# Patient Record
Sex: FEMALE | Race: WHITE | Employment: FULL TIME | URBAN - METROPOLITAN AREA
[De-identification: names, ages, dates, MRNs, and addresses within clinical notes are randomized per-mention and may not be internally consistent; named-entity substitution may affect disease eponyms.]

---

## 2018-01-29 PROBLEM — R00.2 PALPITATIONS: Status: ACTIVE | Noted: 2018-01-29

## 2019-01-15 ENCOUNTER — HOSPITAL ENCOUNTER (EMERGENCY)
Age: 29
Discharge: HOME | End: 2019-01-15
Payer: COMMERCIAL

## 2019-01-15 VITALS
DIASTOLIC BLOOD PRESSURE: 72 MMHG | HEART RATE: 76 BPM | WEIGHT: 165 LBS | HEIGHT: 63 IN | RESPIRATION RATE: 16 BRPM | SYSTOLIC BLOOD PRESSURE: 128 MMHG | TEMPERATURE: 98 F | BODY MASS INDEX: 29.23 KG/M2 | OXYGEN SATURATION: 98 %

## 2019-01-15 DIAGNOSIS — N39.0 URINARY TRACT INFECTION WITH HEMATURIA, SITE UNSPECIFIED: Primary | ICD-10-CM

## 2019-01-15 DIAGNOSIS — R31.9 URINARY TRACT INFECTION WITH HEMATURIA, SITE UNSPECIFIED: Primary | ICD-10-CM

## 2019-01-15 LAB
BACTERIA: ABNORMAL /HPF
BILIRUBIN URINE: ABNORMAL
BLOOD, URINE: ABNORMAL
CLARITY: CLEAR
COLOR: ABNORMAL
GLUCOSE URINE: 100 MG/DL
HCG(URINE) PREGNANCY TEST: NEGATIVE
KETONES, URINE: ABNORMAL MG/DL
LEUKOCYTE ESTERASE, URINE: ABNORMAL
NITRITE, URINE: POSITIVE
PH UA: 5.5 (ref 5–9)
PROTEIN UA: 100 MG/DL
RBC UA: ABNORMAL /HPF (ref 0–2)
SPECIFIC GRAVITY UA: 1.01 (ref 1–1.03)
UROBILINOGEN, URINE: >=8 E.U./DL
WBC UA: ABNORMAL /HPF (ref 0–5)

## 2019-01-15 PROCEDURE — 81025 URINE PREGNANCY TEST: CPT

## 2019-01-15 PROCEDURE — 87088 URINE BACTERIA CULTURE: CPT

## 2019-01-15 PROCEDURE — 81001 URINALYSIS AUTO W/SCOPE: CPT

## 2019-01-15 PROCEDURE — 99283 EMERGENCY DEPT VISIT LOW MDM: CPT

## 2019-01-15 PROCEDURE — 87077 CULTURE AEROBIC IDENTIFY: CPT

## 2019-01-15 PROCEDURE — 87186 SC STD MICRODIL/AGAR DIL: CPT

## 2019-01-15 RX ORDER — PHENAZOPYRIDINE HYDROCHLORIDE 200 MG/1
200 TABLET, FILM COATED ORAL 3 TIMES DAILY PRN
Qty: 6 TABLET | Refills: 0 | Status: SHIPPED | OUTPATIENT
Start: 2019-01-15 | End: 2019-01-17

## 2019-01-15 RX ORDER — CEFDINIR 300 MG/1
300 CAPSULE ORAL 2 TIMES DAILY
Qty: 14 CAPSULE | Refills: 0 | Status: SHIPPED | OUTPATIENT
Start: 2019-01-15 | End: 2019-01-22

## 2019-01-15 ASSESSMENT — PAIN DESCRIPTION - DESCRIPTORS: DESCRIPTORS: BURNING

## 2019-01-15 ASSESSMENT — PAIN DESCRIPTION - FREQUENCY: FREQUENCY: CONTINUOUS

## 2019-01-15 ASSESSMENT — PAIN DESCRIPTION - PAIN TYPE: TYPE: ACUTE PAIN

## 2019-01-18 LAB
ORGANISM: ABNORMAL
URINE CULTURE, ROUTINE: ABNORMAL
URINE CULTURE, ROUTINE: ABNORMAL

## 2019-02-02 ENCOUNTER — HOSPITAL ENCOUNTER (EMERGENCY)
Age: 29
Discharge: HOME | End: 2019-02-02
Attending: FAMILY MEDICINE
Payer: COMMERCIAL

## 2019-02-02 VITALS
TEMPERATURE: 98.2 F | HEART RATE: 97 BPM | BODY MASS INDEX: 27.31 KG/M2 | HEIGHT: 64 IN | DIASTOLIC BLOOD PRESSURE: 72 MMHG | SYSTOLIC BLOOD PRESSURE: 118 MMHG | RESPIRATION RATE: 16 BRPM | OXYGEN SATURATION: 98 % | WEIGHT: 160 LBS

## 2019-02-02 DIAGNOSIS — N39.0 URINARY TRACT INFECTION WITHOUT HEMATURIA, SITE UNSPECIFIED: Primary | ICD-10-CM

## 2019-02-02 LAB
BACTERIA: ABNORMAL /HPF
BILIRUBIN URINE: NEGATIVE
BLOOD, URINE: NEGATIVE
CLARITY: ABNORMAL
COLOR: YELLOW
EPITHELIAL CELLS, UA: ABNORMAL /HPF
GLUCOSE URINE: NEGATIVE MG/DL
HCG(URINE) PREGNANCY TEST: NEGATIVE
KETONES, URINE: NEGATIVE MG/DL
LEUKOCYTE ESTERASE, URINE: NEGATIVE
NITRITE, URINE: NEGATIVE
PH UA: 6 (ref 5–9)
PROTEIN UA: NEGATIVE MG/DL
RBC UA: ABNORMAL /HPF (ref 0–2)
SPECIFIC GRAVITY UA: 1.01 (ref 1–1.03)
UROBILINOGEN, URINE: 0.2 E.U./DL
WBC UA: ABNORMAL /HPF (ref 0–5)
YEAST: ABNORMAL

## 2019-02-02 PROCEDURE — 81001 URINALYSIS AUTO W/SCOPE: CPT

## 2019-02-02 PROCEDURE — 87186 SC STD MICRODIL/AGAR DIL: CPT

## 2019-02-02 PROCEDURE — 87077 CULTURE AEROBIC IDENTIFY: CPT

## 2019-02-02 PROCEDURE — 99283 EMERGENCY DEPT VISIT LOW MDM: CPT

## 2019-02-02 PROCEDURE — 81025 URINE PREGNANCY TEST: CPT

## 2019-02-02 PROCEDURE — 87088 URINE BACTERIA CULTURE: CPT

## 2019-02-02 RX ORDER — NITROFURANTOIN 25; 75 MG/1; MG/1
100 CAPSULE ORAL 2 TIMES DAILY
Qty: 14 CAPSULE | Refills: 0 | Status: SHIPPED | OUTPATIENT
Start: 2019-02-02 | End: 2019-02-12

## 2019-02-02 RX ORDER — PHENAZOPYRIDINE HYDROCHLORIDE 100 MG/1
100 TABLET, FILM COATED ORAL 3 TIMES DAILY PRN
COMMUNITY
End: 2022-03-31

## 2019-02-02 ASSESSMENT — PAIN DESCRIPTION - FREQUENCY: FREQUENCY: INTERMITTENT

## 2019-02-02 ASSESSMENT — PAIN DESCRIPTION - LOCATION: LOCATION: PERINEUM

## 2019-02-02 ASSESSMENT — PAIN - FUNCTIONAL ASSESSMENT: PAIN_FUNCTIONAL_ASSESSMENT: ACTIVITIES ARE NOT PREVENTED

## 2019-02-02 ASSESSMENT — PAIN DESCRIPTION - DESCRIPTORS: DESCRIPTORS: DISCOMFORT;PRESSURE

## 2019-02-02 ASSESSMENT — PAIN SCALES - GENERAL: PAINLEVEL_OUTOF10: 5

## 2019-02-02 ASSESSMENT — PAIN DESCRIPTION - PAIN TYPE: TYPE: ACUTE PAIN

## 2019-02-04 LAB
ORGANISM: ABNORMAL
URINE CULTURE, ROUTINE: ABNORMAL
URINE CULTURE, ROUTINE: ABNORMAL

## 2022-02-04 ENCOUNTER — APPOINTMENT (OUTPATIENT)
Dept: CT IMAGING | Age: 32
End: 2022-02-04
Payer: OTHER GOVERNMENT

## 2022-02-04 ENCOUNTER — HOSPITAL ENCOUNTER (EMERGENCY)
Age: 32
Discharge: HOME | End: 2022-02-04
Attending: EMERGENCY MEDICINE
Payer: OTHER GOVERNMENT

## 2022-02-04 VITALS
BODY MASS INDEX: 28.35 KG/M2 | TEMPERATURE: 98.3 F | HEART RATE: 80 BPM | DIASTOLIC BLOOD PRESSURE: 75 MMHG | RESPIRATION RATE: 16 BRPM | HEIGHT: 63 IN | WEIGHT: 160 LBS | OXYGEN SATURATION: 99 % | SYSTOLIC BLOOD PRESSURE: 111 MMHG

## 2022-02-04 DIAGNOSIS — R51.9 NONINTRACTABLE EPISODIC HEADACHE, UNSPECIFIED HEADACHE TYPE: ICD-10-CM

## 2022-02-04 DIAGNOSIS — J32.9 OTHER SINUSITIS, UNSPECIFIED CHRONICITY: Primary | ICD-10-CM

## 2022-02-04 LAB
ANION GAP SERPL CALCULATED.3IONS-SCNC: 14 MMOL/L (ref 7–16)
BASOPHILS ABSOLUTE: 0.08 E9/L (ref 0–0.2)
BASOPHILS RELATIVE PERCENT: 1.6 % (ref 0–2)
BUN BLDV-MCNC: 9 MG/DL (ref 6–20)
CALCIUM SERPL-MCNC: 9.5 MG/DL (ref 8.6–10.2)
CHLORIDE BLD-SCNC: 102 MMOL/L (ref 98–107)
CO2: 25 MMOL/L (ref 22–29)
CREAT SERPL-MCNC: 0.7 MG/DL (ref 0.5–1)
EOSINOPHILS ABSOLUTE: 0.19 E9/L (ref 0.05–0.5)
EOSINOPHILS RELATIVE PERCENT: 3.9 % (ref 0–6)
GFR AFRICAN AMERICAN: >60
GFR NON-AFRICAN AMERICAN: >60 ML/MIN/1.73
GLUCOSE BLD-MCNC: 96 MG/DL (ref 74–99)
HCG, URINE, POC: NEGATIVE
HCT VFR BLD CALC: 41.9 % (ref 34–48)
HEMOGLOBIN: 14.2 G/DL (ref 11.5–15.5)
IMMATURE GRANULOCYTES #: 0.01 E9/L
IMMATURE GRANULOCYTES %: 0.2 % (ref 0–5)
LYMPHOCYTES ABSOLUTE: 1.49 E9/L (ref 1.5–4)
LYMPHOCYTES RELATIVE PERCENT: 30.7 % (ref 20–42)
Lab: NORMAL
MCH RBC QN AUTO: 29.1 PG (ref 26–35)
MCHC RBC AUTO-ENTMCNC: 33.9 % (ref 32–34.5)
MCV RBC AUTO: 85.9 FL (ref 80–99.9)
MONOCYTES ABSOLUTE: 0.42 E9/L (ref 0.1–0.95)
MONOCYTES RELATIVE PERCENT: 8.7 % (ref 2–12)
NEGATIVE QC PASS/FAIL: NORMAL
NEUTROPHILS ABSOLUTE: 2.66 E9/L (ref 1.8–7.3)
NEUTROPHILS RELATIVE PERCENT: 54.9 % (ref 43–80)
PDW BLD-RTO: 11.9 FL (ref 11.5–15)
PLATELET # BLD: 192 E9/L (ref 130–450)
PMV BLD AUTO: 11.9 FL (ref 7–12)
POSITIVE QC PASS/FAIL: NORMAL
POTASSIUM REFLEX MAGNESIUM: 4 MMOL/L (ref 3.5–5)
RBC # BLD: 4.88 E12/L (ref 3.5–5.5)
SODIUM BLD-SCNC: 141 MMOL/L (ref 132–146)
WBC # BLD: 4.9 E9/L (ref 4.5–11.5)

## 2022-02-04 PROCEDURE — 70450 CT HEAD/BRAIN W/O DYE: CPT

## 2022-02-04 PROCEDURE — 80048 BASIC METABOLIC PNL TOTAL CA: CPT

## 2022-02-04 PROCEDURE — 70486 CT MAXILLOFACIAL W/O DYE: CPT

## 2022-02-04 PROCEDURE — 85025 COMPLETE CBC W/AUTO DIFF WBC: CPT

## 2022-02-04 PROCEDURE — 99285 EMERGENCY DEPT VISIT HI MDM: CPT

## 2022-02-04 ASSESSMENT — PAIN DESCRIPTION - FREQUENCY: FREQUENCY: INTERMITTENT

## 2022-02-04 ASSESSMENT — PAIN DESCRIPTION - DESCRIPTORS: DESCRIPTORS: PRESSURE

## 2022-02-04 ASSESSMENT — PAIN DESCRIPTION - LOCATION: LOCATION: HEAD

## 2022-02-04 ASSESSMENT — PAIN DESCRIPTION - ONSET: ONSET: GRADUAL

## 2022-02-04 ASSESSMENT — PAIN - FUNCTIONAL ASSESSMENT: PAIN_FUNCTIONAL_ASSESSMENT: PREVENTS OR INTERFERES SOME ACTIVE ACTIVITIES AND ADLS

## 2022-02-04 ASSESSMENT — PAIN DESCRIPTION - ORIENTATION: ORIENTATION: ANTERIOR

## 2022-02-04 ASSESSMENT — PAIN DESCRIPTION - PAIN TYPE: TYPE: ACUTE PAIN

## 2022-02-04 ASSESSMENT — PAIN SCALES - GENERAL: PAINLEVEL_OUTOF10: 4
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
## 2022-02-05 ASSESSMENT — ENCOUNTER SYMPTOMS
ABDOMINAL PAIN: 0
SORE THROAT: 0
SHORTNESS OF BREATH: 0
DIARRHEA: 0
RHINORRHEA: 1
BACK PAIN: 0
COUGH: 0
VOMITING: 0
NAUSEA: 0

## 2022-04-27 ENCOUNTER — APPOINTMENT (OUTPATIENT)
Dept: ULTRASOUND IMAGING | Age: 32
End: 2022-04-27
Payer: OTHER GOVERNMENT

## 2022-04-27 ENCOUNTER — HOSPITAL ENCOUNTER (EMERGENCY)
Age: 32
Discharge: HOME | End: 2022-04-27
Payer: OTHER GOVERNMENT

## 2022-04-27 VITALS
DIASTOLIC BLOOD PRESSURE: 66 MMHG | TEMPERATURE: 98.1 F | HEART RATE: 88 BPM | OXYGEN SATURATION: 100 % | RESPIRATION RATE: 16 BRPM | SYSTOLIC BLOOD PRESSURE: 140 MMHG

## 2022-04-27 DIAGNOSIS — O46.90 VAGINAL BLEEDING IN PREGNANCY: Primary | ICD-10-CM

## 2022-04-27 LAB
ABO/RH: NORMAL
ANION GAP SERPL CALCULATED.3IONS-SCNC: 10 MMOL/L (ref 7–16)
BACTERIA: ABNORMAL /HPF
BASOPHILS ABSOLUTE: 0.04 E9/L (ref 0–0.2)
BASOPHILS RELATIVE PERCENT: 0.5 % (ref 0–2)
BILIRUBIN URINE: NEGATIVE
BLOOD, URINE: ABNORMAL
BUN BLDV-MCNC: 8 MG/DL (ref 6–20)
CALCIUM SERPL-MCNC: 9.3 MG/DL (ref 8.6–10.2)
CHLORIDE BLD-SCNC: 101 MMOL/L (ref 98–107)
CLARITY: CLEAR
CO2: 24 MMOL/L (ref 22–29)
COLOR: YELLOW
CREAT SERPL-MCNC: 0.6 MG/DL (ref 0.5–1)
EOSINOPHILS ABSOLUTE: 0.24 E9/L (ref 0.05–0.5)
EOSINOPHILS RELATIVE PERCENT: 3 % (ref 0–6)
GFR AFRICAN AMERICAN: >60
GFR NON-AFRICAN AMERICAN: >60 ML/MIN/1.73
GLUCOSE BLD-MCNC: 98 MG/DL (ref 74–99)
GLUCOSE URINE: NEGATIVE MG/DL
HCG, URINE, POC: POSITIVE
HCT VFR BLD CALC: 38.2 % (ref 34–48)
HEMOGLOBIN: 13 G/DL (ref 11.5–15.5)
IMMATURE GRANULOCYTES #: 0.03 E9/L
IMMATURE GRANULOCYTES %: 0.4 % (ref 0–5)
KETONES, URINE: NEGATIVE MG/DL
LEUKOCYTE ESTERASE, URINE: NEGATIVE
LYMPHOCYTES ABSOLUTE: 1.88 E9/L (ref 1.5–4)
LYMPHOCYTES RELATIVE PERCENT: 23.8 % (ref 20–42)
Lab: NORMAL
MCH RBC QN AUTO: 29.4 PG (ref 26–35)
MCHC RBC AUTO-ENTMCNC: 34 % (ref 32–34.5)
MCV RBC AUTO: 86.4 FL (ref 80–99.9)
MONOCYTES ABSOLUTE: 0.62 E9/L (ref 0.1–0.95)
MONOCYTES RELATIVE PERCENT: 7.9 % (ref 2–12)
NEGATIVE QC PASS/FAIL: NORMAL
NEUTROPHILS ABSOLUTE: 5.08 E9/L (ref 1.8–7.3)
NEUTROPHILS RELATIVE PERCENT: 64.4 % (ref 43–80)
NITRITE, URINE: NEGATIVE
PDW BLD-RTO: 12 FL (ref 11.5–15)
PH UA: 6.5 (ref 5–9)
PLATELET # BLD: 207 E9/L (ref 130–450)
PMV BLD AUTO: 11.4 FL (ref 7–12)
POSITIVE QC PASS/FAIL: NORMAL
POTASSIUM SERPL-SCNC: 3.9 MMOL/L (ref 3.5–5)
PROTEIN UA: NEGATIVE MG/DL
RBC # BLD: 4.42 E12/L (ref 3.5–5.5)
RBC UA: ABNORMAL /HPF (ref 0–2)
SODIUM BLD-SCNC: 135 MMOL/L (ref 132–146)
SPECIFIC GRAVITY UA: 1.02 (ref 1–1.03)
UROBILINOGEN, URINE: 0.2 E.U./DL
WBC # BLD: 7.9 E9/L (ref 4.5–11.5)
WBC UA: ABNORMAL /HPF (ref 0–5)

## 2022-04-27 PROCEDURE — 76817 TRANSVAGINAL US OBSTETRIC: CPT

## 2022-04-27 PROCEDURE — 99284 EMERGENCY DEPT VISIT MOD MDM: CPT

## 2022-04-27 PROCEDURE — 80048 BASIC METABOLIC PNL TOTAL CA: CPT

## 2022-04-27 PROCEDURE — 86900 BLOOD TYPING SEROLOGIC ABO: CPT

## 2022-04-27 PROCEDURE — 85025 COMPLETE CBC W/AUTO DIFF WBC: CPT

## 2022-04-27 PROCEDURE — 86901 BLOOD TYPING SEROLOGIC RH(D): CPT

## 2022-04-27 PROCEDURE — 76801 OB US < 14 WKS SINGLE FETUS: CPT

## 2022-04-27 PROCEDURE — 81001 URINALYSIS AUTO W/SCOPE: CPT

## 2022-04-27 PROCEDURE — 36415 COLL VENOUS BLD VENIPUNCTURE: CPT

## 2022-04-27 NOTE — ED PROVIDER NOTES
[facility]    HPI:  22,   Time: 4:14 PM EDT         Morelia Sal is a 33 y.o. female presenting to the ED for vaginal bleeding in pregnancy, beginning this a.m. ago. The complaint has been intermittent, mild in severity, and worsened by nothing. Presents for complaints of a sudden onset of vaginal bleeding which began approximately early a.m. this morning. States he is approximately 12 weeks pregnant she is a  A1. She states after working night shift she came home got onto the toilet to go to the bathroom and noticed that she had heavy bleeding in the toilet as well as on the tissue paper. She denies any abdominal pain cramping or pain at this time. She is being followed by Dr. Gloria Maza and did have an ultrasound to confirm an IUP approximately 3 weeks ago. She states the vaginal bleeding has subsided over the past few hours. ROS:   Pertinent positives and negatives are stated within HPI, all other systems reviewed and are negative.  --------------------------------------------- PAST HISTORY ---------------------------------------------  Past Medical History:  has a past medical history of Ovarian cyst.    Past Surgical History:  has no past surgical history on file. Social History:  reports that she has never smoked. She has never used smokeless tobacco. She reports that she does not drink alcohol and does not use drugs. Family History: family history includes Hypertension in her father and mother; Leukemia in her maternal grandmother. The patients home medications have been reviewed. Allergies: Patient has no known allergies.     -------------------------------------------------- RESULTS -------------------------------------------------  All laboratory and radiology results have been personally reviewed by myself   LABS:  Results for orders placed or performed during the hospital encounter of 22   CBC with Auto Differential   Result Value Ref Range    WBC 7.9 4.5 - 11.5 E9/L    RBC 4.42 3.50 - 5.50 E12/L    Hemoglobin 13.0 11.5 - 15.5 g/dL    Hematocrit 38.2 34.0 - 48.0 %    MCV 86.4 80.0 - 99.9 fL    MCH 29.4 26.0 - 35.0 pg    MCHC 34.0 32.0 - 34.5 %    RDW 12.0 11.5 - 15.0 fL    Platelets 378 076 - 100 E9/L    MPV 11.4 7.0 - 12.0 fL    Neutrophils % 64.4 43.0 - 80.0 %    Immature Granulocytes % 0.4 0.0 - 5.0 %    Lymphocytes % 23.8 20.0 - 42.0 %    Monocytes % 7.9 2.0 - 12.0 %    Eosinophils % 3.0 0.0 - 6.0 %    Basophils % 0.5 0.0 - 2.0 %    Neutrophils Absolute 5.08 1.80 - 7.30 E9/L    Immature Granulocytes # 0.03 E9/L    Lymphocytes Absolute 1.88 1.50 - 4.00 E9/L    Monocytes Absolute 0.62 0.10 - 0.95 E9/L    Eosinophils Absolute 0.24 0.05 - 0.50 E9/L    Basophils Absolute 0.04 0.00 - 0.20 U3/E   Basic Metabolic Panel   Result Value Ref Range    Sodium 135 132 - 146 mmol/L    Potassium 3.9 3.5 - 5.0 mmol/L    Chloride 101 98 - 107 mmol/L    CO2 24 22 - 29 mmol/L    Anion Gap 10 7 - 16 mmol/L    Glucose 98 74 - 99 mg/dL    BUN 8 6 - 20 mg/dL    CREATININE 0.6 0.5 - 1.0 mg/dL    GFR Non-African American >60 >=60 mL/min/1.73    GFR African American >60     Calcium 9.3 8.6 - 10.2 mg/dL   Urinalysis   Result Value Ref Range    Color, UA Yellow Straw/Yellow    Clarity, UA Clear Clear    Glucose, Ur Negative Negative mg/dL    Bilirubin Urine Negative Negative    Ketones, Urine Negative Negative mg/dL    Specific Gravity, UA 1.020 1.005 - 1.030    Blood, Urine LARGE (A) Negative    pH, UA 6.5 5.0 - 9.0    Protein, UA Negative Negative mg/dL    Urobilinogen, Urine 0.2 <2.0 E.U./dL    Nitrite, Urine Negative Negative    Leukocyte Esterase, Urine Negative Negative   Microscopic Urinalysis   Result Value Ref Range    WBC, UA NONE 0 - 5 /HPF    RBC, UA 2-5 0 - 2 /HPF    Bacteria, UA RARE (A) None Seen /HPF   POC Pregnancy Urine Qual   Result Value Ref Range    HCG, Urine, POC Positive Negative    Lot Number [id number]     Positive QC Pass/Fail Pass     Negative QC Pass/Fail Pass ABO/RH   Result Value Ref Range    ABO/Rh O POS        RADIOLOGY:  Interpreted by Radiologist.  US OB TRANSVAGINAL   Final Result   1. Single living intrauterine gestation with an estimated gestational age by   ultrasound of 13 weeks and 1 day which is concordant to the clinical age   provided of 12 weeks and 4 days. Estimated date of delivery based on current   ultrasound is November 1, 2022. Fetal heart rate was 154 beats per minute. 2.  The cervix appears closed at the internal cervical os. There is a small   amount of heterogeneous fluid near the external cervical os. (Attention to the cervical length suggested at the patient's next ultrasound.)      3. Nonvisualization of either ovary. There are no adnexal masses. Recommend scheduling the patient for anatomic survey at approximately 20   weeks gestation. Imaging should occur sooner if patient's symptoms continue   to worsen. US OB LESS THAN 14 WEEKS SINGLE OR FIRST GESTATION   Final Result   1. Single living intrauterine gestation with an estimated gestational age by   ultrasound of 13 weeks and 1 day which is concordant to the clinical age   provided of 12 weeks and 4 days. Estimated date of delivery based on current   ultrasound is November 1, 2022. Fetal heart rate was 154 beats per minute. 2.  The cervix appears closed at the internal cervical os. There is a small   amount of heterogeneous fluid near the external cervical os. (Attention to the cervical length suggested at the patient's next ultrasound.)      3. Nonvisualization of either ovary. There are no adnexal masses. Recommend scheduling the patient for anatomic survey at approximately 20   weeks gestation. Imaging should occur sooner if patient's symptoms continue   to worsen.              ------------------------- NURSING NOTES AND VITALS REVIEWED ---------------------------   The nursing notes within the ED encounter and vital signs as below have been reviewed. BP (!) 140/66   Pulse 88   Temp 98.1 °F (36.7 °C) (Oral)   Resp 16   LMP 01/29/2022   SpO2 100%   Oxygen Saturation Interpretation: Normal      ---------------------------------------------------PHYSICAL EXAM--------------------------------------      Constitutional/General: Alert and oriented x3, well appearing, non toxic in NAD  Head: NC/AT  Eyes: PERRL, EOMI  Mouth: Oropharynx clear, handling secretions, no trismus  Neck: Supple, full ROM, no meningeal signs  Pulmonary: Lungs clear to auscultation bilaterally, no wheezes, rales, or rhonchi. Not in respiratory distress  Cardiovascular:  Regular rate and rhythm, no murmurs, gallops, or rubs. 2+ distal pulses  Abdomen: Soft, non tender, non distended,   : Pelvic exam completed with female chaperone present. Small amount of brown old bloody vaginal discharge noted. No cervical motion tenderness noted. Cervix is closed. no adnexal tenderness. Cultures were obtained. Patient tolerated well. Extremities: Moves all extremities x 4. Warm and well perfused  Skin: warm and dry without rash  Neurologic: GCS 15,  Psych: Normal Affect      ------------------------------ ED COURSE/MEDICAL DECISION MAKING----------------------  Medications - No data to display      Medical Decision Making:    Labs and ultrasound reviewed at length good fetal heart rate at 154. Patient advised to follow-up with OB/GYN if any further problems advised to maintain strict pelvic rest until seen by OB/GYN    Counseling: The emergency provider has spoken with the patient and discussed todays results, in addition to providing specific details for the plan of care and counseling regarding the diagnosis and prognosis. Questions are answered at this time and they are agreeable with the plan.      --------------------------------- IMPRESSION AND DISPOSITION ---------------------------------    IMPRESSION  1.  Vaginal bleeding in pregnancy        DISPOSITION  Disposition: Discharge to home  Patient condition is good                  JUSTIN Reynoso - LEROY  04/27/22 1665

## 2022-04-27 NOTE — ED NOTES
Department of Emergency Medicine  FIRST PROVIDER TRIAGE NOTE             [facility]           4/27/22  2:54 PM EDT    Date of Encounter: 4/27/22   MRN: [id number]      HPI: Sebastian Rossi is a 33 y.o. female who presents to the ED for Vaginal Bleeding (12 weeks pregnant, woke this afternoon and has heavy amount bleeding )      ROS: Negative for cp, sob, fever or cough. PE: Gen Appearance/Constitutional: alert  Musculoskeletal: moves all extremities x 4     Initial Plan of Care: All treatment areas with department are currently occupied. Plan to order/Initiate the following while awaiting opening in ED: labs and imaging studies.   Initiate Treatment-Testing, Proceed toTreatment Area When Bed Available for ED Attending/MLP to Continue Care    Electronically signed by JUSTIN Prater CNP   DD: 4/27/22       JUSTIN Liu CNP  04/27/22 [id number]

## 2022-05-05 ENCOUNTER — ANCILLARY PROCEDURE (OUTPATIENT)
Dept: OBGYN CLINIC | Age: 32
End: 2022-05-05
Payer: OTHER GOVERNMENT

## 2022-05-05 ENCOUNTER — INITIAL PRENATAL (OUTPATIENT)
Dept: OBGYN CLINIC | Age: 32
End: 2022-05-05
Payer: OTHER GOVERNMENT

## 2022-05-05 VITALS
HEART RATE: 111 BPM | BODY MASS INDEX: 30.91 KG/M2 | DIASTOLIC BLOOD PRESSURE: 83 MMHG | SYSTOLIC BLOOD PRESSURE: 120 MMHG | WEIGHT: 174.5 LBS

## 2022-05-05 DIAGNOSIS — O41.8X20 SUBCHORIONIC HEMATOMA IN SECOND TRIMESTER, SINGLE OR UNSPECIFIED FETUS: ICD-10-CM

## 2022-05-05 DIAGNOSIS — Z34.90 THIRD PREGNANCY: ICD-10-CM

## 2022-05-05 DIAGNOSIS — Z3A.14 14 WEEKS GESTATION OF PREGNANCY: ICD-10-CM

## 2022-05-05 DIAGNOSIS — O46.92 ANTEPARTUM BLEEDING, SECOND TRIMESTER: ICD-10-CM

## 2022-05-05 DIAGNOSIS — R00.2 PALPITATIONS: Primary | ICD-10-CM

## 2022-05-05 DIAGNOSIS — O46.8X2 SUBCHORIONIC HEMATOMA IN SECOND TRIMESTER, SINGLE OR UNSPECIFIED FETUS: ICD-10-CM

## 2022-05-05 LAB
GLUCOSE URINE, POC: NEGATIVE
PROTEIN UA: NEGATIVE

## 2022-05-05 PROCEDURE — 76817 TRANSVAGINAL US OBSTETRIC: CPT | Performed by: OBSTETRICS & GYNECOLOGY

## 2022-05-05 PROCEDURE — 81002 URINALYSIS NONAUTO W/O SCOPE: CPT | Performed by: OBSTETRICS & GYNECOLOGY

## 2022-05-05 PROCEDURE — 99203 OFFICE O/P NEW LOW 30 MIN: CPT | Performed by: OBSTETRICS & GYNECOLOGY

## 2022-05-05 PROCEDURE — 99999 PR OFFICE/OUTPT VISIT,PROCEDURE ONLY: CPT | Performed by: OBSTETRICS & GYNECOLOGY

## 2022-05-05 PROCEDURE — 76805 OB US >/= 14 WKS SNGL FETUS: CPT | Performed by: OBSTETRICS & GYNECOLOGY

## 2022-05-05 NOTE — PROGRESS NOTES
Pt here for pregnancy ultrasound  Pt denies any bleeding/cramping/lof since last Friday -had bleeding last Wednesday and went to ER -had ultrasound and had bleeding on Friday ?  Hematoma  Pt states no bleeding now

## 2022-05-05 NOTE — PROGRESS NOTES
[address] FETAL MEDICINE  [address] [facility] [address] [phone]   [address] FETAL MEDICINE  [address]  [facility] [place] [address]  Dept: [address]: [phone]     2022    RE:  Jero Becerril     : 1990   AGE: 33 y.o. Dear Dr. Rupali Rubi,    Thank you for allowing me to see Jero Becerril. As I'm sure you will recall, Jero Becerril is a 33 y.o. [id number]'s last menstrual period was 2022. Estimated Date of Delivery: 22 at 14w2d seen in our office today for the following:    REASON FOR VISIT: Cervical Length    Patient Active Problem List    Diagnosis Date Noted    14 weeks gestation of pregnancy 2022    Third pregnancy 2022    Antepartum bleeding, second trimester 2022    Subchorionic hematoma in second trimester 2022    Palpitations 2018        PAST HISTORY:  OB History    Para Term  AB Living   3 1 1   1 1   SAB IAB Ectopic Molar Multiple Live Births     1       1      # Outcome Date GA Lbr Juan Carlos/2nd Weight Sex Delivery Anes PTL Lv   3 Current            2 Term 13 40w1d 07:45 7 lb 14 oz (3.572 kg) M Vag-Spont EPI N CK   1 IAB                   MEDICAL:  Past Medical History:   Diagnosis Date    Ovarian cyst         SURGICAL:  Past Surgical History:   Procedure Laterality Date    UMBILICAL HERNIA REPAIR         ALLERGIES:    No Known Allergies      MEDICATIONS:    Current Outpatient Medications   Medication Sig Dispense Refill    Prenatal Vit-Fe Fumarate-FA (PRENATAL PLUS) 27-1 MG TABS Take 1 tablet by mouth daily 30 tablet 11     No current facility-administered medications for this visit.         Social History     Socioeconomic History    Marital status:      Spouse name: None    Number of children: None    Years of education: None    Highest education level: None   Occupational History    None   Tobacco Use    Smoking status: Never Smoker    Smokeless tobacco: Never Used   Vaping Use    Vaping Use: Never used   Substance and Sexual Activity    Alcohol use: No    Drug use: No    Sexual activity: Yes     Partners: Male   Other Topics Concern    None   Social History Narrative    None     Social Determinants of Health     Financial Resource Strain:     Difficulty of Paying Living Expenses: Not on file   Food Insecurity:     Worried About Running Out of Food in the Last Year: Not on file    Jm of Food in the Last Year: Not on file   Transportation Needs:     Lack of Transportation (Medical): Not on file    Lack of Transportation (Non-Medical):  Not on file   Physical Activity:     Days of Exercise per Week: Not on file    Minutes of Exercise per Session: Not on file   Stress:     Feeling of Stress : Not on file   Social Connections:     Frequency of Communication with Friends and Family: Not on file    Frequency of Social Gatherings with Friends and Family: Not on file    Attends Jainism Services: Not on file    Active Member of [address] [place] [address] or Organizations: Not on file    Attends Club or Organization Meetings: Not on file    Marital Status: Not on file   Intimate Partner Violence:     Fear of Current or Ex-Partner: Not on file    Emotionally Abused: Not on file    Physically Abused: Not on file    Sexually Abused: Not on file   Housing Stability:     Unable to Pay for Housing in the Last Year: Not on file    Number of [place] in the Last Year: Not on file    Unstable Housing in the Last Year: Not on file          FAMILY MEDICAL HISTORY:   Family History   Problem Relation Age of Onset    Leukemia Maternal Grandmother     Hypertension Father     Hypertension Mother               PHYSICAL EXAMINATION:  /83   Pulse 111   Wt 174 lb 8 oz (79.2 kg)   LMP 01/29/2022   Body mass index is 30.91 kg/m². Urine dipstick:  Results for POC orders placed in visit on 05/05/22   POCT urine qual dipstick protein   Result Value Ref Range    Protein, UA Negative Negative   POCT urine qual dipstick glucose   Result Value Ref Range    Glucose, UA POC negative         A/an Cervical Length ultrasound was done in our office today. Please refer to the enclosed copy of the ultrasound report for further information. Discussion:  1. There is a johnson fetus in a variable presentation. Fetal cardiac motion fetal motion was observed and appeared to be grossly normal.  2. There is been appropriate interval growth. 3. There is a small subchorionic separation that is noted it is 2.5 x 5 cm. 4. The placenta is low-lying to previa. 5. The cervix is 5 and half centimeters long and there is no beaking or funneling. IMPRESSION:  1. Single intrauterine gestation at 14+ weeks with a small subchorionic separation noted. The cervix appears to be long and closed. 2. The fetal anatomy appears normal and the fetal lie is variable. 3. The amniotic fluid volume is normal and the placenta is anterior. RECOMMENDATIONS:  Each of the recommendations were discussed with the patient:  1. Return in 3 weeks for repeat cervical length. 2.  Return in 6 weeks for level 2 anatomy scan. 3.  I had the patient refrain from intercourse, but stimulation or orgasm. 4.  I thought she could go back to work but to avoid heavy lifting. 5.  Follow-up would be otherwise as clinically indicated. The patient is to continue to follow with you in your office for ongoing obstetric care. PLAN:    As noted above or sooner prn.     Sincerely,        Tanika Castillo MD

## 2022-05-05 NOTE — PATIENT INSTRUCTIONS
Patient Education        Weeks 10 to 14 of Your Pregnancy: Care Instructions  Overview     By weeks 10 to 15 of your pregnancy, the placenta has formed inside your uterus. The placenta's main job is to give your baby oxygen and nutrientsthrough the umbilical cord. It's possible to hear your baby's heartbeat with a special ultrasound device. Your baby's organs are developing. The arms and legs can bend. This is a good time to think about testing for birth defects. There are two types of tests: screening and diagnostic. Screening tests show the chance that a baby has a certain birth defect. They can't tell you for sure that your babyhas a problem. Diagnostic tests show if a baby has a certain birth defect. It's your choice whether to have these tests. You and your partner can talk toyour doctor or midwife about tests for birth defects. Follow-up care is a key part of your treatment and safety. Be sure to make and go to all appointments, and call your doctor if you are having problems. It's also a good idea to know your test results and keep alist of the medicines you take. How can you care for yourself at home? Decide about tests   You can have screening tests and diagnostic tests to check for birth defects. The decision to have a test for birth defects is personal. Think about your age, your chance of passing on a family disease, your need to know about any problems, and what you might do after you have the test results. ? Quadruple (quad) blood test. This screening test can be done between 15 and 22 weeks of pregnancy. It checks the amount of four substances in your blood. The doctor looks at these test results, along with your age and other factors, to find out the chance that your baby may have certain problems. ? Amniocentesis. This diagnostic test is used to look for chromosomal problems in the baby's cells.  It can be done between 15 and 20 weeks of pregnancy, usually around week 16.  ? Nuchal translucency test. This test uses ultrasound to measure the thickness of the area at the back of the baby's neck. An increase in the thickness can be an early sign of Down syndrome. ? Chorionic villus sampling (CVS). This is a test that looks for certain genetic problems with your baby. The same genes that are in your baby are in the placenta. A small piece of the placenta is taken out and tested. This test is done when you are 10 to 13 weeks pregnant. Ease discomfort   Slow down and take naps when you feel tired.  If your emotions swing, talk to someone.  If your gums bleed, try a softer toothbrush. If your gums are puffy and bleed a lot, see your dentist.  [facility] If you feel dizzy:  ? Get up slowly after sitting or lying down. ? Drink plenty of fluids. ? Eat small snacks to keep your blood sugar stable. ? Put your head between your legs as though you were tying your shoelaces. ? Lie down with your legs higher than your head. Use pillows to prop up your feet.  If you have a headache:  ? Lie down. ? Ask your partner or a good friend for a neck massage. ? Try cool cloths over your forehead or across the back of your neck. ? Use acetaminophen (Tylenol) for pain relief. Do not use nonsteroidal anti-inflammatory drugs (NSAIDs), such as ibuprofen (Advil, Motrin) or naproxen (Aleve), unless your doctor says it is okay.  If you have a nosebleed, pinch your nose gently, and hold it for a short while. To prevent nosebleeds, try massaging a small dab of petroleum jelly, such as Vaseline, in your nostrils.  If your nose is stuffed up, try saline (saltwater) nose sprays. Do not use decongestant sprays. Care for your breasts   Wear a bra that gives you good support.  Know that changes in your breasts are normal.  ? Your breasts may get larger and more tender. Tenderness usually gets better by 12 weeks. ? Your nipples may get darker and larger, and small bumps around your nipples may show more. ?  The veins in your chest and breasts may show more. Where can you learn more? Go to [email or web address]://[email or web address] org and sign in to your [email or web address] account. Enter F031 in the [facility] box to learn more about \"Weeks 10 to 14 of Your Pregnancy: Care Instructions. \"     If you do not have an account, please click on the \"Sign Up Now\" link. Current as of: June 16, 2021               Content Version: 13.2  © 9726-9352 Healthwise, [employer]. Care instructions adapted under license by [facility] [facility] ([facility]). If you have questions about a medical condition or this instruction, always ask your healthcare professional. [place] [address] any warranty or liability for your use of this information. Patient Education        Learning About When to Call Your Doctor During Pregnancy (Up to 20 Weeks)  Overview     It's common to have concerns about what might be a problem during your pregnancy. Most pregnancies don't have any serious problems. But it's stillimportant to know when to call your doctor if you have certain symptoms. These are general suggestions. Your doctor may give you some more informationabout when to call. When to call your doctor (up to 20 weeks)  Call 911 anytime you think you may need emergency care. For example, call if:   You passed out (lost consciousness). Call your doctor now or seek immediate medical care if:   You have a fever.  You have vaginal bleeding.  You are dizzy or lightheaded, or you feel like you may faint.  You have symptoms of a urinary tract infection. These may include:  ? Pain or burning when you urinate. ? A frequent need to urinate without being able to pass much urine. ? Pain in the flank, which is just below the rib cage and above the waist on either side of the back. ? Blood in your urine.  You have belly pain.  You think you are having contractions.  You have a sudden release of fluid from your vagina.   Watch closely for changes in your health, and be sure to contact your doctor if:   You have vaginal discharge that smells bad.  You have other concerns about your pregnancy. Follow-up care is a key part of your treatment and safety. Be sure to make and go to all appointments, and call your doctor if you are having problems. It's also a good idea to know your test results and keep alist of the medicines you take. Where can you learn more? Go to https://[email or web address].ripplrr [email or web address] org and sign in to your Livrada account. Enter C145 in the [employer] box to learn more about \"Learning About When to Call Your Doctor During Pregnancy (Up to 20 Weeks). \"     If you do not have an account, please click on the \"Sign Up Now\" link. Current as of: June 16, 2021               Content Version: 13.2  © 8981-0735 Healthwise, Incorporated. Care instructions adapted under license by [facility] ([facility]). If you have questions about a medical condition or this instruction, always ask your healthcare professional. [address] any warranty or liability for your use of this information. Please arrive for your scheduled appointment at least 15 minutes early with your actual insurance card+ a photo ID. Also if you need any refills ordered or have questions, it may take up 48 hours to reply. Please allow ample time for your refills. Call me when you use last refill. Thank you for your cooperation. Call your primary obstetrician with bleeding, leaking of fluid, abdominal tenderness, headache, blurry vision, epigastric pain and increased urinary frequency. If you are experiencing an emergency and need immediate help, call 911 or go to go emergency room or labor and delivery. if you are sick, not feeling well or have an infectious process going on please reschedule your appointment by calling [phone]. Also if any family members are not feeling well, please do not bring them to your appointment. We appreciate your cooperation.  We are doing this in order to protect our pregnant mothers+ their babies.

## 2022-05-26 ENCOUNTER — ROUTINE PRENATAL (OUTPATIENT)
Dept: OBGYN CLINIC | Age: 32
End: 2022-05-26
Payer: COMMERCIAL

## 2022-05-26 ENCOUNTER — ANCILLARY PROCEDURE (OUTPATIENT)
Dept: OBGYN CLINIC | Age: 32
End: 2022-05-26
Payer: COMMERCIAL

## 2022-05-26 VITALS
SYSTOLIC BLOOD PRESSURE: 121 MMHG | HEART RATE: 100 BPM | WEIGHT: 175.38 LBS | DIASTOLIC BLOOD PRESSURE: 83 MMHG | BODY MASS INDEX: 31.07 KG/M2

## 2022-05-26 DIAGNOSIS — U07.1 COVID-19 AFFECTING PREGNANCY IN SECOND TRIMESTER: ICD-10-CM

## 2022-05-26 DIAGNOSIS — O98.512 COVID-19 AFFECTING PREGNANCY IN SECOND TRIMESTER: ICD-10-CM

## 2022-05-26 DIAGNOSIS — Z3A.17 17 WEEKS GESTATION OF PREGNANCY: Primary | ICD-10-CM

## 2022-05-26 PROBLEM — O46.92 ANTEPARTUM BLEEDING, SECOND TRIMESTER: Status: RESOLVED | Noted: 2022-05-05 | Resolved: 2022-05-26

## 2022-05-26 LAB
GLUCOSE URINE, POC: NEGATIVE
PROTEIN UA: NEGATIVE

## 2022-05-26 PROCEDURE — 99213 OFFICE O/P EST LOW 20 MIN: CPT | Performed by: OBSTETRICS & GYNECOLOGY

## 2022-05-26 PROCEDURE — 99213 OFFICE O/P EST LOW 20 MIN: CPT

## 2022-05-26 PROCEDURE — 76817 TRANSVAGINAL US OBSTETRIC: CPT | Performed by: OBSTETRICS & GYNECOLOGY

## 2022-05-26 PROCEDURE — 99999 PR OFFICE/OUTPT VISIT,PROCEDURE ONLY: CPT | Performed by: OBSTETRICS & GYNECOLOGY

## 2022-05-26 NOTE — PROGRESS NOTES
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

## 2022-05-26 NOTE — PATIENT INSTRUCTIONS
Patient Education        Weeks 14 to 18 of Your Pregnancy: Care Instructions  Overview     During this time, you may start to \"show,\" so that you look pregnant to people around you. You may also notice some changes in your skin, such as itchy spotson your palms or acne on your face. Your baby is now able to pass urine. And your baby's first stool (meconium) is starting to collect in your baby's intestines. Hair is also starting to grow onyour baby's head. At your next visit, between weeks 18 and 20, your doctor may do an ultrasound test. The test allows your doctor to check for certain problems. Your doctor can also tell the sex of your baby. So this a good time to think about whetheryou want to know. Talk to your doctor about getting a flu shot to help keep you healthy duringyour pregnancy. As your pregnancy moves along, it's common to worry or feel anxious. Your body is changing a lot. And you are thinking about giving birth, the health of your baby, and becoming a parent. You can talk to your doctor about any anxiety andstress you feel. Follow-up care is a key part of your treatment and safety. Be sure to make and go to all appointments, and call your doctor if you are having problems. It's also a good idea to know your test results and keep alist of the medicines you take. How can you care for yourself at home? Reduce stress     Ask for help with cooking and housekeeping.      Figure out who or what causes your stress. Avoid these people or situations as much as possible.      Relax every day. Taking 10- to 15-minute breaks can make a big difference. Take a walk, listen to music, or take a warm bath.      Learn relaxation techniques at prenatal or yoga class. Or buy a relaxation tape.      List your fears about having a baby and becoming a parent. Share the list with someone you trust. Decide which worries are really small, and try to let them go.    Exercise     If you did not exercise much before pregnancy, start slowly. Walking is best. [employer], and do a little more every day.      Brisk walking, easy jogging, low-impact aerobics, water aerobics, and yoga are good choices. Some sports, such as scuba diving, horseback riding, downhill skiing, gymnastics, and water skiing, are not a good idea.      Try to do at least 2½ hours a week of moderate exercise, such as a fast walk. One way to do this is to be active 30 minutes a day, at least 5 days a week.      Wear loose clothing. And wear shoes and a bra that provide good support.      Warm up and cool down to start and finish your exercise.      If you want to use weights, be sure to use light weights. They reduce stress on your joints. Stay at the best weight for you     Experts recommend that you gain about 1 pound a month during the first 3 months of your pregnancy.      Experts recommend that you gain about 1 pound a week during your last 6 months of pregnancy, for a total weight gain of 25 to 35 pounds.      If you are underweight, you will need to gain more weight (about 28 to 40 pounds).      If you are overweight, you may not need to gain as much weight (about 15 to 25 pounds).      If you are gaining weight too fast, use common sense. Exercise every day, and limit sweets, fast foods, and fats. Choose lean meats, fruits, and vegetables.      If you are having twins or more, your doctor may refer you to a dietitian. Where can you learn more? Go to https://[email or web address] Technologies.KnowledgeMill. org and sign in to your SAVORTEX account. Enter R612 in the [facility] box to learn more about \"Weeks 14 to 18 of Your Pregnancy: Care Instructions. \"     If you do not have an account, please click on the \"Sign Up Now\" link. Current as of: June 16, 2021               Content Version: 13.2  © 5019-0676 Healthwise, Incorporated. Care instructions adapted under license by [facility] ([facility]).  If you have questions about a medical condition or this instruction, always ask your healthcare professional. [place] [address] any warranty or liability for your use of this information. Patient Education        Learning About When to Call Your Doctor During Pregnancy (Up to 20 Weeks)  Overview     It's common to have concerns about what might be a problem during your pregnancy. Most pregnancies don't have any serious problems. But it's stillimportant to know when to call your doctor if you have certain symptoms. These are general suggestions. Your doctor may give you some more informationabout when to call. When to call your doctor (up to 20 weeks)  Call 911 anytime you think you may need emergency care. For example, call if:   You passed out (lost consciousness). Call your doctor now or seek immediate medical care if:   You have a fever.  You have vaginal bleeding.  You are dizzy or lightheaded, or you feel like you may faint.  You have symptoms of a urinary tract infection. These may include:  ? Pain or burning when you urinate. ? A frequent need to urinate without being able to pass much urine. ? Pain in the flank, which is just below the rib cage and above the waist on either side of the back. ? Blood in your urine.  You have belly pain.  You think you are having contractions.  You have a sudden release of fluid from your vagina. Watch closely for changes in your health, and be sure to contact your doctor if:   You have vaginal discharge that smells bad.  You have other concerns about your pregnancy. Follow-up care is a key part of your treatment and safety. Be sure to make and go to all appointments, and call your doctor if you are having problems. It's also a good idea to know your test results and keep alist of the medicines you take. Where can you learn more? Go to [email or web address] [email or web address] org and sign in to your [email or web address] account.  Enter Q063 in the [employer] box to learn more about \"Learning About When to Call Your Doctor During Pregnancy (Up to 20 Weeks). \"     If you do not have an account, please click on the \"Sign Up Now\" link. Current as of: June 16, 2021               Content Version: 13.2  © 2006-2022 Healthwise, Incorporated. Care instructions adapted under license by [facility] ([facility]). If you have questions about a medical condition or this instruction, always ask your healthcare professional. [address] any warranty or liability for your use of this information. Please arrive for your scheduled appointment at least 15 minutes early with your actual insurance card+ a photo ID. Also if you need any refills ordered or have questions, it may take up 48 hours to reply. Please allow ample time for your refills. Call me when you use last refill. Thank you for your cooperation. You might be having an NST at your next appt. Please eat a large snack or breakfast before coming to office. Thank youCall your primary obstetrician with bleeding, leaking of fluid, abdominal tenderness, headache, blurry vision, epigastric pain and increased urinary frequency. If you are experiencing an emergency and need immediate help, call 911 or go to go emergency room or labor and delivery. if you are sick, not feeling well or have an infectious process going on please reschedule your appointment by calling [phone]. Also if any family members are not feeling well, please do not bring them to your appointment. We appreciate your cooperation. We are doing this in order to protect our pregnant mothers+ their babies. if you are sick, not feeling well or have an infectious process going on please reschedule your appointment by calling [phone]. Also if any family members are not feeling well, please do not bring them to your appointment. We appreciate your cooperation. We are doing this in order to protect our pregnant mothers+ their babies.

## 2022-05-26 NOTE — PROGRESS NOTES
[address] FETAL MEDICINE  [address] [facility] [address] [phone]   [facility] [address] FETAL MEDICINE  [address]  [facility] [address]  Dept: [phone]  Loc: [phone]     2022    RE:  Aleyda Krishna     : 1990   AGE: 33 y.o. Dear Dr. Ellie Sharpe,    Thank you for allowing me to see Aleyda Krishna. As I'm sure you will recall, Aleyda Krishna is a 33 y.o. [id number]'s last menstrual period was 2022. Estimated Date of Delivery: 22 at 17w2d seen in our office today for the following:    REASON FOR VISIT: Cervical Length    Patient Active Problem List    Diagnosis Date Noted    COVID-19 affecting pregnancy in second trimester 2022    17 weeks gestation of pregnancy 2022    Third pregnancy 2022    Subchorionic hematoma in second trimester 2022    Palpitations 2018        PAST HISTORY:  OB History    Para Term  AB Living   3 1 1   1 1   SAB IAB Ectopic Molar Multiple Live Births     1       1      # Outcome Date GA Lbr Juan Carlos/2nd Weight Sex Delivery Anes PTL Lv   3 Current            2 Term 13 40w1d 07:45 7 lb 14 oz (3.572 kg) M Vag-Spont EPI N CK   1 IAB                   MEDICAL:  Past Medical History:   Diagnosis Date    Antepartum bleeding, second trimester 2022    Ovarian cyst         SURGICAL:  Past Surgical History:   Procedure Laterality Date    UMBILICAL HERNIA REPAIR         ALLERGIES:    No Known Allergies      MEDICATIONS:    Current Outpatient Medications   Medication Sig Dispense Refill    Prenatal Vit-Fe Fumarate-FA (PRENATAL PLUS) 27-1 MG TABS Take 1 tablet by mouth daily 30 tablet 11     No current facility-administered medications for this visit.         Social History     Socioeconomic History    Marital status:      Spouse name: None    Number of children: None    Years of education: None    Highest education level: None   Occupational History    None   Tobacco Use    Smoking status: Never Smoker    Smokeless tobacco: Never Used   Vaping Use    Vaping Use: Never used   Substance and Sexual Activity    Alcohol use: No    Drug use: No    Sexual activity: Yes     Partners: Male   Other Topics Concern    None   Social History Narrative    None     Social Determinants of Health     Financial Resource Strain:     Difficulty of Paying Living Expenses: Not on file   Food Insecurity:     Worried About Running Out of Food in the Last Year: Not on file    Jm of Food in the Last Year: Not on file   Transportation Needs:     Lack of Transportation (Medical): Not on file    Lack of Transportation (Non-Medical):  Not on file   Physical Activity:     Days of Exercise per Week: Not on file    Minutes of Exercise per Session: Not on file   Stress:     Feeling of Stress : Not on file   Social Connections:     Frequency of Communication with Friends and Family: Not on file    Frequency of Social Gatherings with Friends and Family: Not on file    Attends Zoroastrianism Services: Not on file    Active Member of [address] [place] [address] or Organizations: Not on file    Attends Club or Organization Meetings: Not on file    Marital Status: Not on file   Intimate Partner Violence:     Fear of Current or Ex-Partner: Not on file    Emotionally Abused: Not on file    Physically Abused: Not on file    Sexually Abused: Not on file   Housing Stability:     Unable to Pay for Housing in the Last Year: Not on file    Number of [place] in the Last Year: Not on file    Unstable Housing in the Last Year: Not on file          FAMILY MEDICAL HISTORY:   Family History   Problem Relation Age of Onset    Leukemia Maternal Grandmother     Hypertension Father     Hypertension Mother               PHYSICAL EXAMINATION:  /83   Pulse 100   Wt 175 lb 6 oz (79.5 kg)   LMP 01/29/2022   Body mass index is 31.07 kg/m². Urine dipstick:  Results for POC orders placed in visit on 05/26/22   POCT urine qual dipstick protein   Result Value Ref Range    Protein, UA Negative Negative   POCT urine qual dipstick glucose   Result Value Ref Range    Glucose, UA POC negative         A/an Cervical Length ultrasound was done in our office today. Please refer to the enclosed copy of the ultrasound report for further information. Discussion:  1. Single live intrauterine gestation in a variable presentation. Fetal cardiac motion, fetal motion, and fetal tone was observed and appeared to be grossly normal.  2. Cervix is long and closed measuring 4+ centimeters. 3. A significant amount of the previously noted subchorionic separation is resolved. 4. Amniotic fluid volume is normal.  The placenta is anterior. IMPRESSION:  1. Single intrauterine gestation at 17+ weeks with reassuring cervical length. 2. The fetal anatomy appears normal and the fetal lie is variable. 3. The amniotic fluid volume is normal and the placenta is anterior, the subchorionic separation is smaller. RECOMMENDATIONS:  Each of the recommendations were discussed with the patient:  1. Follow-up anatomy scan in 3 weeks. 2.  Follow-up would be otherwise as clinically indicated. 3.  Repeat cervical length in 3 weeks as well. The patient is to continue to follow with you in your office for ongoing obstetric care. PLAN:    As noted above or sooner prn.     Sincerely,        Manjeet Chanel MD

## 2022-06-16 ENCOUNTER — ROUTINE PRENATAL (OUTPATIENT)
Dept: OBGYN CLINIC | Age: 32
End: 2022-06-16
Payer: COMMERCIAL

## 2022-06-16 ENCOUNTER — ANCILLARY PROCEDURE (OUTPATIENT)
Dept: OBGYN CLINIC | Age: 32
End: 2022-06-16
Payer: COMMERCIAL

## 2022-06-16 VITALS
DIASTOLIC BLOOD PRESSURE: 74 MMHG | HEART RATE: 92 BPM | WEIGHT: 176 LBS | BODY MASS INDEX: 31.18 KG/M2 | SYSTOLIC BLOOD PRESSURE: 108 MMHG

## 2022-06-16 DIAGNOSIS — Z3A.20 20 WEEKS GESTATION OF PREGNANCY: Primary | ICD-10-CM

## 2022-06-16 LAB
GLUCOSE URINE, POC: NORMAL
PROTEIN UA: NEGATIVE

## 2022-06-16 PROCEDURE — 76811 OB US DETAILED SNGL FETUS: CPT | Performed by: OBSTETRICS & GYNECOLOGY

## 2022-06-16 PROCEDURE — 99213 OFFICE O/P EST LOW 20 MIN: CPT

## 2022-06-16 PROCEDURE — 99213 OFFICE O/P EST LOW 20 MIN: CPT | Performed by: OBSTETRICS & GYNECOLOGY

## 2022-06-16 PROCEDURE — 99999 PR OFFICE/OUTPT VISIT,PROCEDURE ONLY: CPT | Performed by: OBSTETRICS & GYNECOLOGY

## 2022-06-16 NOTE — PROGRESS NOTES
[address] FETAL MEDICINE  [address] [facility] [address]  [phone]   [address] FETAL MEDICINE  [address]  [facility] [address]  Dept: [phone]  Loc: [phone]     2022    RE:  Ana Maria Chambers     : 1990   AGE: 33 y.o. Dear Dr. Kishore Nobles,    Thank you for allowing me to see Ana Maria Chambers. As I'm sure you will recall, Ana Maria Chambers is a 33 y.o. [id number]'s last menstrual period was 2022. Estimated Date of Delivery: 22 at 20w2d seen in our office today for the following:    REASON FOR VISIT: Level II    Patient Active Problem List    Diagnosis Date Noted    COVID-19 affecting pregnancy in second trimester 2022    20 weeks gestation of pregnancy 2022    Third pregnancy 2022    Subchorionic hematoma in second trimester 2022    Palpitations 2018        PAST HISTORY:  OB History    Para Term  AB Living   3 1 1   1 1   SAB IAB Ectopic Molar Multiple Live Births     1       1      # Outcome Date GA Lbr Juan Carlos/2nd Weight Sex Delivery Anes PTL Lv   3 Current            2 Term 13 40w1d 07:45 7 lb 14 oz (3.572 kg) M Vag-Spont EPI N CK   1 IAB                   MEDICAL:  Past Medical History:   Diagnosis Date    Antepartum bleeding, second trimester 2022    Ovarian cyst         SURGICAL:  Past Surgical History:   Procedure Laterality Date    UMBILICAL HERNIA REPAIR         ALLERGIES:    No Known Allergies      MEDICATIONS:    Current Outpatient Medications   Medication Sig Dispense Refill    Prenatal Vit-Fe Fumarate-FA (PRENATAL PLUS) 27-1 MG TABS Take 1 tablet by mouth daily 30 tablet 11     No current facility-administered medications for this visit.         Social History     Socioeconomic History    Marital status:      Spouse name: None    Number of children: None    Years of education: None    Highest education level: None   Occupational History    None   Tobacco Use    Smoking status: Never Smoker    Smokeless tobacco: Never Used   Vaping Use    Vaping Use: Never used   Substance and Sexual Activity    Alcohol use: No    Drug use: No    Sexual activity: Yes     Partners: Male   Other Topics Concern    None   Social History Narrative    None     Social Determinants of Health     Financial Resource Strain:     Difficulty of Paying Living Expenses: Not on file   Food Insecurity:     Worried About Running Out of Food in the Last Year: Not on file    Jm of Food in the Last Year: Not on file   Transportation Needs:     Lack of Transportation (Medical): Not on file    Lack of Transportation (Non-Medical):  Not on file   Physical Activity:     Days of Exercise per Week: Not on file    Minutes of Exercise per Session: Not on file   Stress:     Feeling of Stress : Not on file   Social Connections:     Frequency of Communication with Friends and Family: Not on file    Frequency of Social Gatherings with Friends and Family: Not on file    Attends Gnosticist Services: Not on file    Active Member of [address] [place] [address] or Organizations: Not on file    Attends Club or Organization Meetings: Not on file    Marital Status: Not on file   Intimate Partner Violence:     Fear of Current or Ex-Partner: Not on file    Emotionally Abused: Not on file    Physically Abused: Not on file    Sexually Abused: Not on file   Housing Stability:     Unable to Pay for Housing in the Last Year: Not on file    Number of [place] in the Last Year: Not on file    Unstable Housing in the Last Year: Not on file          FAMILY MEDICAL HISTORY:   Family History   Problem Relation Age of Onset    Leukemia Maternal Grandmother     Hypertension Father     Hypertension Mother               PHYSICAL EXAMINATION:  /74   Pulse 92   Wt 176 lb (79.8 kg) LMP 01/29/2022   Body mass index is 31.18 kg/m². Urine dipstick:  Results for POC orders placed in visit on 06/16/22   POCT urine qual dipstick protein   Result Value Ref Range    Protein, UA Negative Negative   POCT urine qual dipstick glucose   Result Value Ref Range    Glucose, UA POC neg         A/an Level II ultrasound was done in our office today. Please refer to the enclosed copy of the ultrasound report for further information. Discussion:  1. There is a single live intrauterine gestation in a vertex presentation. Fetal cardiac motion, fetal motion, and fetal tone was observed and appeared to be grossly normal.  2. There is been appropriate interval growth. 3. There is been resolution of the previously noted low-lying placenta. Amniotic fluid volume is normal.  4. No obvious anomalies were noted. IMPRESSION:  1. Single intrauterine gestation at 20+ weeks with appropriate interval growth. 2. The fetal anatomy appears normal and the fetal lie is cephalic. 3. The amniotic fluid volume is normal and the placenta is anterior no longer a previa. 4.  There is been resolution of the previously noted subchorionic separation. RECOMMENDATIONS:  Each of the recommendations were discussed with the patient:  1. Follow-up would be otherwise as clinically indicated. The patient is to continue to follow with you in your office for ongoing obstetric care. PLAN:    As noted above or sooner prn.     Sincerely,        Lucy Arevalo MD

## 2022-06-16 NOTE — PATIENT INSTRUCTIONS
Patient Education        Learning About When to Call Your Doctor During Pregnancy (Up to 20 Weeks)  Overview     It's common to have concerns about what might be a problem during your pregnancy. Most pregnancies don't have any serious problems. But it's stillimportant to know when to call your doctor if you have certain symptoms. These are general suggestions. Your doctor may give you some more informationabout when to call. When to call your doctor (up to 20 weeks)  Call 911 anytime you think you may need emergency care. For example, call if:   You passed out (lost consciousness). Call your doctor now or seek immediate medical care if:   You have a fever.  You have vaginal bleeding.  You are dizzy or lightheaded, or you feel like you may faint.  You have symptoms of a urinary tract infection. These may include:  ? Pain or burning when you urinate. ? A frequent need to urinate without being able to pass much urine. ? Pain in the flank, which is just below the rib cage and above the waist on either side of the back. ? Blood in your urine.  You have belly pain.  You think you are having contractions.  You have a sudden release of fluid from your vagina. Watch closely for changes in your health, and be sure to contact your doctor if:   You have vaginal discharge that smells bad.  You have other concerns about your pregnancy. Follow-up care is a key part of your treatment and safety. Be sure to make and go to all appointments, and call your doctor if you are having problems. It's also a good idea to know your test results and keep alist of the medicines you take. Where can you learn more? Go to [email or web address] and sign in to your [employer] account. Enter I586 in the [facility] box to learn more about \"Learning About When to Call Your Doctor During Pregnancy (Up to 20 Weeks). \"     If you do not have an account, please click on the \"Sign Up Now\" link.  Current as of: June 16, 2021               Content Version: 13.2  © 5960-3471 Healthwise, Incorporated. Care instructions adapted under license by [facility] ([facility]). If you have questions about a medical condition or this instruction, always ask your healthcare professional. [address] any warranty or liability for your use of this information.

## 2022-08-23 ENCOUNTER — HOSPITAL ENCOUNTER (OUTPATIENT)
Age: 32
Setting detail: OBSERVATION
Discharge: HOME | End: 2022-08-24
Attending: OBSTETRICS & GYNECOLOGY | Admitting: OBSTETRICS & GYNECOLOGY
Payer: COMMERCIAL

## 2022-08-23 VITALS
TEMPERATURE: 98.6 F | DIASTOLIC BLOOD PRESSURE: 55 MMHG | RESPIRATION RATE: 14 BRPM | SYSTOLIC BLOOD PRESSURE: 112 MMHG | HEART RATE: 87 BPM

## 2022-08-23 PROBLEM — O26.899 CRAMPING AFFECTING PREGNANCY, ANTEPARTUM: Status: ACTIVE | Noted: 2022-08-23

## 2022-08-23 PROBLEM — R10.9 CRAMPING AFFECTING PREGNANCY, ANTEPARTUM: Status: ACTIVE | Noted: 2022-08-23

## 2022-08-23 LAB
AMORPHOUS: ABNORMAL
BACTERIA: ABNORMAL /HPF
BILIRUBIN URINE: NEGATIVE
BLOOD, URINE: NEGATIVE
CLARITY: CLEAR
COLOR: YELLOW
EPITHELIAL CELLS, UA: ABNORMAL /HPF
GLUCOSE URINE: NEGATIVE MG/DL
KETONES, URINE: 40 MG/DL
LEUKOCYTE ESTERASE, URINE: NEGATIVE
NITRITE, URINE: NEGATIVE
PH UA: 6.5 (ref 5–9)
PROTEIN UA: NEGATIVE MG/DL
RBC UA: ABNORMAL /HPF (ref 0–2)
SPECIFIC GRAVITY UA: 1.02 (ref 1–1.03)
UROBILINOGEN, URINE: 0.2 E.U./DL
WBC UA: ABNORMAL /HPF (ref 0–5)

## 2022-08-23 PROCEDURE — 96374 THER/PROPH/DIAG INJ IV PUSH: CPT

## 2022-08-23 PROCEDURE — 81001 URINALYSIS AUTO W/SCOPE: CPT

## 2022-08-23 PROCEDURE — 99219 PR INITIAL OBSERVATION CARE/DAY 50 MINUTES: CPT | Performed by: ADVANCED PRACTICE MIDWIFE

## 2022-08-23 PROCEDURE — 96361 HYDRATE IV INFUSION ADD-ON: CPT

## 2022-08-23 PROCEDURE — 6360000002 HC RX W HCPCS: Performed by: OBSTETRICS & GYNECOLOGY

## 2022-08-23 PROCEDURE — G0378 HOSPITAL OBSERVATION PER HR: HCPCS

## 2022-08-23 PROCEDURE — 96372 THER/PROPH/DIAG INJ SC/IM: CPT

## 2022-08-23 PROCEDURE — 2580000003 HC RX 258: Performed by: OBSTETRICS & GYNECOLOGY

## 2022-08-23 RX ORDER — ONDANSETRON 2 MG/ML
4 INJECTION INTRAMUSCULAR; INTRAVENOUS EVERY 6 HOURS PRN
Status: DISCONTINUED | OUTPATIENT
Start: 2022-08-23 | End: 2022-08-24 | Stop reason: HOSPADM

## 2022-08-23 RX ORDER — ASPIRIN 81 MG/1
81 TABLET, CHEWABLE ORAL DAILY
COMMUNITY

## 2022-08-23 RX ORDER — SODIUM CHLORIDE, SODIUM LACTATE, POTASSIUM CHLORIDE, AND CALCIUM CHLORIDE .6; .31; .03; .02 G/100ML; G/100ML; G/100ML; G/100ML
500 INJECTION, SOLUTION INTRAVENOUS ONCE
Status: COMPLETED | OUTPATIENT
Start: 2022-08-23 | End: 2022-08-24

## 2022-08-23 RX ORDER — TERBUTALINE SULFATE 1 MG/ML
0.25 INJECTION, SOLUTION SUBCUTANEOUS ONCE
Status: COMPLETED | OUTPATIENT
Start: 2022-08-23 | End: 2022-08-23

## 2022-08-23 RX ORDER — SODIUM CHLORIDE, SODIUM LACTATE, POTASSIUM CHLORIDE, CALCIUM CHLORIDE 600; 310; 30; 20 MG/100ML; MG/100ML; MG/100ML; MG/100ML
INJECTION, SOLUTION INTRAVENOUS CONTINUOUS
Status: DISCONTINUED | OUTPATIENT
Start: 2022-08-23 | End: 2022-08-24 | Stop reason: HOSPADM

## 2022-08-23 RX ADMIN — TERBUTALINE SULFATE 0.25 MG: 1 INJECTION, SOLUTION SUBCUTANEOUS at 23:07

## 2022-08-23 RX ADMIN — SODIUM CHLORIDE, POTASSIUM CHLORIDE, SODIUM LACTATE AND CALCIUM CHLORIDE 500 ML: 600; 310; 30; 20 INJECTION, SOLUTION INTRAVENOUS at 22:48

## 2022-08-23 RX ADMIN — ONDANSETRON 4 MG: 2 INJECTION INTRAMUSCULAR; INTRAVENOUS at 23:07

## 2022-08-24 PROCEDURE — G0378 HOSPITAL OBSERVATION PER HR: HCPCS

## 2022-08-24 PROCEDURE — 96361 HYDRATE IV INFUSION ADD-ON: CPT

## 2022-08-24 NOTE — PROGRESS NOTES
Written and oral discharge instructions given to patient. Pt verbalized understanding. Ambulatory off unit.

## 2022-08-24 NOTE — PROGRESS NOTES
Pt is 30 weeks,  previous vag delivery. Pt presents to LD with c/o ctxs, continuous back pain and nausea that all started 2 hours ago. (states she has had 6 ctx in 2 hours). Pt states that they are not painful, just uncomfortable and she feels pressure, tightening and the urge to pee when she has one. Pt denies any medical issues beside COVID in May. Pt denies LOF or VB. States +FM. Pt states that after one of the ctxs, baby had an increase in movement and then little movement but now has been moving normally. EFM applied, FHT reassuring. Audible FM noted. CTX noted.

## 2022-08-24 NOTE — PROGRESS NOTES
Dr Rivera Hence updated on pt. UA results, SVE unchaged, ctx spaced. Order received to keep patient overnight, IV hydrate and for a general diet.

## 2022-08-24 NOTE — PROGRESS NOTES
The patient has decided to leave against medical advice, because she does not what to stay overnight, has to take child to school in am.    She has normal mental status and adequate capacity to make medical decisions. The patient refuses hospital admission and wants to be discharged. The risks have been explained to the patient, including worsening illness, chronic pain, permanent disability, and death of herself and unborn child. The benefits of admission have also been explained, including the availability and proximity of nurses, physicians, monitoring, diagnostic testing, and treatment. The patient was able to understand and state the risks and benefits of hospital admission. This was witnessed by nurse Blanca Sanders and me. She had the opportunity to ask questions about her medical condition. The patient was treated to the extent that she would allow, and knows that she may return for care at any time. Follow up has been discussed and arranged with Dr. Risa Lu. JUSTIN Streeter CNM  .

## 2022-08-24 NOTE — PROGRESS NOTES
Pt informed that doctor would like to monitor pt overnight. Pt states that she would like to leave AMA because it is her son's first day of school in the morning. Will update doctor about pt's decision.

## 2022-08-24 NOTE — H&P
CHIEF COMPLAINT:  contractions cramping for a few hours nor feels like needs to pee, some n/v with this    HISTORY OF PRESENT ILLNESS:      The patient is a 33 y.o. female at 26w0d. OB History          3    Para   1    Term   1            AB   1    Living   1         SAB        IAB   1    Ectopic        Molar        Multiple        Live Births   1            Patient presents with a chief complaint as above and is being admitted for observation    Estimated Due Date: Estimated Date of Delivery: 22    PRENATAL CARE:    Complicated by: none    PAST OB HISTORY  OB History          3    Para   1    Term   1            AB   1    Living   1         SAB        IAB   1    Ectopic        Molar        Multiple        Live Births   1                Past Medical History:        Diagnosis Date    Antepartum bleeding, second trimester 2022    Ovarian cyst      Past Surgical History:        Procedure Laterality Date    UMBILICAL HERNIA REPAIR       Allergies:  Patient has no known allergies.   Social History:    Social History     Socioeconomic History    Marital status:      Spouse name: Not on file    Number of children: Not on file    Years of education: Not on file    Highest education level: Not on file   Occupational History    Not on file   Tobacco Use    Smoking status: Never    Smokeless tobacco: Never   Vaping Use    Vaping Use: Never used   Substance and Sexual Activity    Alcohol use: No    Drug use: No    Sexual activity: Yes     Partners: Male   Other Topics Concern    Not on file   Social History Narrative    Not on file     Social Determinants of Health     Financial Resource Strain: Not on file   Food Insecurity: Not on file   Transportation Needs: Not on file   Physical Activity: Not on file   Stress: Not on file   Social Connections: Not on file   Intimate Partner Violence: Not on file   Housing Stability: Not on file     Family History:       Problem Relation Age of Onset    Leukemia Maternal Grandmother     Hypertension Father     Hypertension Mother      Medications Prior to Admission:  Medications Prior to Admission: aspirin 81 MG chewable tablet, Take 81 mg by mouth daily  Prenatal Vit-Fe Fumarate-FA (PRENATAL PLUS) 27-1 MG TABS, Take 1 tablet by mouth daily    REVIEW OF SYSTEMS:    CONSTITUTIONAL:  negative  RESPIRATORY:  negative  CARDIOVASCULAR:  negative  GASTROINTESTINAL:  negative  ALLERGIC/IMMUNOLOGIC:  negative  NEUROLOGICAL:  negative  BEHAVIOR/PSYCH:  negative    PHYSICAL EXAM:  Vitals:    08/23/22 2214   BP: (!) 112/55   Pulse: 87   Resp: 14   Temp: 98.6 °F (37 °C)   TempSrc: Oral     General appearance:  awake, alert, cooperative, no apparent distress, and appears stated age  Neurologic:  Awake, alert, oriented to name, place and time. Lungs:  No increased work of breathing, good air exchange  Abdomen:  Soft, non tender, gravid, consistent with her gestational age, EFW by Leopald's manouever was aga   Fetal heart rate:  Reassuring.   Pelvis:  Adequate pelvis  Cervix: ft 50% medium -3  Contraction frequency:  4-9 minutes    Membranes:  Intact    ASSESSMENT AND PLAN:   Discussed with Dr Kathleen Gibbons IV, Valentino NORMAN terb Augustine Decant, APRN - DOV

## 2022-09-02 PROBLEM — U07.1 COVID-19 AFFECTING PREGNANCY IN THIRD TRIMESTER: Status: ACTIVE | Noted: 2022-09-02

## 2022-09-02 PROBLEM — O98.513 COVID-19 AFFECTING PREGNANCY IN THIRD TRIMESTER: Status: ACTIVE | Noted: 2022-09-02

## 2022-10-13 ENCOUNTER — HOSPITAL ENCOUNTER (OUTPATIENT)
Age: 32
Discharge: HOME | End: 2022-10-13
Attending: OBSTETRICS & GYNECOLOGY | Admitting: OBSTETRICS & GYNECOLOGY
Payer: COMMERCIAL

## 2022-10-13 VITALS
TEMPERATURE: 98 F | SYSTOLIC BLOOD PRESSURE: 133 MMHG | HEART RATE: 93 BPM | RESPIRATION RATE: 16 BRPM | DIASTOLIC BLOOD PRESSURE: 73 MMHG

## 2022-10-13 PROBLEM — Z3A.37 37 WEEKS GESTATION OF PREGNANCY: Status: ACTIVE | Noted: 2022-10-13

## 2022-10-13 PROCEDURE — 59025 FETAL NON-STRESS TEST: CPT | Performed by: STUDENT IN AN ORGANIZED HEALTH CARE EDUCATION/TRAINING PROGRAM

## 2022-10-13 PROCEDURE — 99211 OFF/OP EST MAY X REQ PHY/QHP: CPT

## 2022-10-13 NOTE — PROGRESS NOTES
37w2d presents to unit from office due to Po Box . Denies any VB, LOF, or ctx. Perceives positive fetal movement. EFM applied. Call light within reach. VSS.

## 2022-10-13 NOTE — PROGRESS NOTES
Diagnosis:   Covid in pregnancy, polyhydramnios, Non ractive Nst in clinic today    Result:  Reactive     + good fetal movement    + accels, moderate variability and no decels    Prolonged monitoring > 1 hour      Plan:  Discharge home    F/U as scheduled      Fartun Gilmore DO

## 2022-10-13 NOTE — DISCHARGE INSTRUCTIONS
Home Undelivered Discharge Instructions    After Discharge Orders:    Keep future appointments. Diet:  normal diet as tolerated    Rest: normal activity as tolerated    Other instructions: Do kick counts once a day on your baby. Choose the time of day your baby is most active. Get in a comfortable lying or sitting position and time how long it takes to feel 10 kicks, twists, turns, swishes, or rolls.  Call your physician or midwife if there have not been 10 kicks in 2 hours    Call physician or midwife, return to Labor and Delivery, call 911, or go to the nearest Emergency Room if: increased leakage or fluid, contractions more than  6 per  1 hour, decreased fetal movement, persistent low back pain or cramping, bleeding from vaginal area, difficulty urinating, pain with urination, difficulty breathing, new calf pain, persistent headache, or vision change

## 2022-10-21 ENCOUNTER — ANESTHESIA EVENT (OUTPATIENT)
Dept: LABOR AND DELIVERY | Age: 32
End: 2022-10-21
Payer: COMMERCIAL

## 2022-10-21 ENCOUNTER — ANESTHESIA (OUTPATIENT)
Dept: LABOR AND DELIVERY | Age: 32
End: 2022-10-21
Payer: COMMERCIAL

## 2022-10-21 ENCOUNTER — HOSPITAL ENCOUNTER (INPATIENT)
Age: 32
LOS: 2 days | Discharge: HOME | End: 2022-10-23
Attending: OBSTETRICS & GYNECOLOGY | Admitting: OBSTETRICS & GYNECOLOGY
Payer: COMMERCIAL

## 2022-10-21 DIAGNOSIS — G89.18 POST-OP PAIN: Primary | ICD-10-CM

## 2022-10-21 PROBLEM — Z3A.38 38 WEEKS GESTATION OF PREGNANCY: Status: ACTIVE | Noted: 2022-10-21

## 2022-10-21 LAB
ABO/RH: NORMAL
AMPHETAMINE SCREEN, URINE: NOT DETECTED
ANTIBODY SCREEN: NORMAL
BARBITURATE SCREEN URINE: NOT DETECTED
BENZODIAZEPINE SCREEN, URINE: NOT DETECTED
CANNABINOID SCREEN URINE: NOT DETECTED
COCAINE METABOLITE SCREEN URINE: NOT DETECTED
FENTANYL SCREEN, URINE: NOT DETECTED
HCT VFR BLD CALC: 38.5 % (ref 34–48)
HEMOGLOBIN: 13.3 G/DL (ref 11.5–15.5)
Lab: NORMAL
MCH RBC QN AUTO: 29.6 PG (ref 26–35)
MCHC RBC AUTO-ENTMCNC: 34.5 % (ref 32–34.5)
MCV RBC AUTO: 85.7 FL (ref 80–99.9)
METHADONE SCREEN, URINE: NOT DETECTED
OPIATE SCREEN URINE: NOT DETECTED
OXYCODONE URINE: NOT DETECTED
PDW BLD-RTO: 13.2 FL (ref 11.5–15)
PHENCYCLIDINE SCREEN URINE: NOT DETECTED
PLATELET # BLD: 154 E9/L (ref 130–450)
PMV BLD AUTO: 11.4 FL (ref 7–12)
RBC # BLD: 4.49 E12/L (ref 3.5–5.5)
WBC # BLD: 8.4 E9/L (ref 4.5–11.5)

## 2022-10-21 PROCEDURE — 3609079900 HC CESAREAN SECTION: Performed by: OBSTETRICS & GYNECOLOGY

## 2022-10-21 PROCEDURE — 2709999900 HC NON-CHARGEABLE SUPPLY: Performed by: OBSTETRICS & GYNECOLOGY

## 2022-10-21 PROCEDURE — 2580000003 HC RX 258: Performed by: OBSTETRICS & GYNECOLOGY

## 2022-10-21 PROCEDURE — 86850 RBC ANTIBODY SCREEN: CPT

## 2022-10-21 PROCEDURE — 7100000000 HC PACU RECOVERY - FIRST 15 MIN: Performed by: OBSTETRICS & GYNECOLOGY

## 2022-10-21 PROCEDURE — 2580000003 HC RX 258: Performed by: NURSE ANESTHETIST, CERTIFIED REGISTERED

## 2022-10-21 PROCEDURE — 80307 DRUG TEST PRSMV CHEM ANLYZR: CPT

## 2022-10-21 PROCEDURE — 85027 COMPLETE CBC AUTOMATED: CPT

## 2022-10-21 PROCEDURE — 1220000000 HC SEMI PRIVATE OB R&B

## 2022-10-21 PROCEDURE — 6360000002 HC RX W HCPCS: Performed by: NURSE ANESTHETIST, CERTIFIED REGISTERED

## 2022-10-21 PROCEDURE — 59514 CESAREAN DELIVERY ONLY: CPT | Performed by: OBSTETRICS & GYNECOLOGY

## 2022-10-21 PROCEDURE — 88307 TISSUE EXAM BY PATHOLOGIST: CPT

## 2022-10-21 PROCEDURE — 6360000002 HC RX W HCPCS: Performed by: ANESTHESIOLOGY

## 2022-10-21 PROCEDURE — 2500000003 HC RX 250 WO HCPCS: Performed by: NURSE ANESTHETIST, CERTIFIED REGISTERED

## 2022-10-21 PROCEDURE — 3700000001 HC ADD 15 MINUTES (ANESTHESIA): Performed by: OBSTETRICS & GYNECOLOGY

## 2022-10-21 PROCEDURE — 86901 BLOOD TYPING SEROLOGIC RH(D): CPT

## 2022-10-21 PROCEDURE — 6370000000 HC RX 637 (ALT 250 FOR IP): Performed by: OBSTETRICS & GYNECOLOGY

## 2022-10-21 PROCEDURE — 6360000002 HC RX W HCPCS: Performed by: OBSTETRICS & GYNECOLOGY

## 2022-10-21 PROCEDURE — 7100000001 HC PACU RECOVERY - ADDTL 15 MIN: Performed by: OBSTETRICS & GYNECOLOGY

## 2022-10-21 PROCEDURE — 86900 BLOOD TYPING SEROLOGIC ABO: CPT

## 2022-10-21 PROCEDURE — 3700000000 HC ANESTHESIA ATTENDED CARE: Performed by: OBSTETRICS & GYNECOLOGY

## 2022-10-21 PROCEDURE — 36415 COLL VENOUS BLD VENIPUNCTURE: CPT

## 2022-10-21 RX ORDER — OXYCODONE HYDROCHLORIDE 5 MG/1
10 TABLET ORAL EVERY 4 HOURS PRN
Status: ACTIVE | OUTPATIENT
Start: 2022-10-21 | End: 2022-10-22

## 2022-10-21 RX ORDER — ONDANSETRON 2 MG/ML
4 INJECTION INTRAMUSCULAR; INTRAVENOUS EVERY 6 HOURS PRN
Status: DISPENSED | OUTPATIENT
Start: 2022-10-21 | End: 2022-10-22

## 2022-10-21 RX ORDER — SODIUM CHLORIDE 0.9 % (FLUSH) 0.9 %
5-40 SYRINGE (ML) INJECTION EVERY 12 HOURS SCHEDULED
Status: DISCONTINUED | OUTPATIENT
Start: 2022-10-21 | End: 2022-10-23 | Stop reason: HOSPADM

## 2022-10-21 RX ORDER — OXYCODONE HYDROCHLORIDE 5 MG/1
5 TABLET ORAL EVERY 4 HOURS PRN
Status: DISPENSED | OUTPATIENT
Start: 2022-10-21 | End: 2022-10-22

## 2022-10-21 RX ORDER — ACETAMINOPHEN 325 MG/1
650 TABLET ORAL EVERY 4 HOURS PRN
Status: ACTIVE | OUTPATIENT
Start: 2022-10-21 | End: 2022-10-22

## 2022-10-21 RX ORDER — SODIUM CHLORIDE, SODIUM LACTATE, POTASSIUM CHLORIDE, AND CALCIUM CHLORIDE .6; .31; .03; .02 G/100ML; G/100ML; G/100ML; G/100ML
1000 INJECTION, SOLUTION INTRAVENOUS ONCE
Status: COMPLETED | OUTPATIENT
Start: 2022-10-21 | End: 2022-10-21

## 2022-10-21 RX ORDER — ONDANSETRON 2 MG/ML
4 INJECTION INTRAMUSCULAR; INTRAVENOUS
Status: DISCONTINUED | OUTPATIENT
Start: 2022-10-21 | End: 2022-10-21 | Stop reason: HOSPADM

## 2022-10-21 RX ORDER — SODIUM CHLORIDE 0.9 % (FLUSH) 0.9 %
5-40 SYRINGE (ML) INJECTION PRN
Status: DISCONTINUED | OUTPATIENT
Start: 2022-10-21 | End: 2022-10-21 | Stop reason: HOSPADM

## 2022-10-21 RX ORDER — IPRATROPIUM BROMIDE AND ALBUTEROL SULFATE 2.5; .5 MG/3ML; MG/3ML
1 SOLUTION RESPIRATORY (INHALATION)
Status: DISCONTINUED | OUTPATIENT
Start: 2022-10-21 | End: 2022-10-21 | Stop reason: HOSPADM

## 2022-10-21 RX ORDER — SODIUM CHLORIDE 0.9 % (FLUSH) 0.9 %
5-40 SYRINGE (ML) INJECTION EVERY 12 HOURS SCHEDULED
Status: DISCONTINUED | OUTPATIENT
Start: 2022-10-21 | End: 2022-10-21 | Stop reason: HOSPADM

## 2022-10-21 RX ORDER — SODIUM CHLORIDE 9 MG/ML
INJECTION, SOLUTION INTRAVENOUS PRN
Status: DISCONTINUED | OUTPATIENT
Start: 2022-10-21 | End: 2022-10-21

## 2022-10-21 RX ORDER — SODIUM CHLORIDE, SODIUM LACTATE, POTASSIUM CHLORIDE, CALCIUM CHLORIDE 600; 310; 30; 20 MG/100ML; MG/100ML; MG/100ML; MG/100ML
INJECTION, SOLUTION INTRAVENOUS CONTINUOUS
Status: DISCONTINUED | OUTPATIENT
Start: 2022-10-21 | End: 2022-10-23 | Stop reason: HOSPADM

## 2022-10-21 RX ORDER — SODIUM CHLORIDE, SODIUM LACTATE, POTASSIUM CHLORIDE, CALCIUM CHLORIDE 600; 310; 30; 20 MG/100ML; MG/100ML; MG/100ML; MG/100ML
INJECTION, SOLUTION INTRAVENOUS CONTINUOUS PRN
Status: DISCONTINUED | OUTPATIENT
Start: 2022-10-21 | End: 2022-10-21 | Stop reason: SDUPTHER

## 2022-10-21 RX ORDER — MIDAZOLAM HYDROCHLORIDE 2 MG/2ML
2 INJECTION, SOLUTION INTRAMUSCULAR; INTRAVENOUS
Status: DISCONTINUED | OUTPATIENT
Start: 2022-10-21 | End: 2022-10-21 | Stop reason: HOSPADM

## 2022-10-21 RX ORDER — METHYLERGONOVINE MALEATE 0.2 MG/ML
INJECTION INTRAVENOUS PRN
Status: DISCONTINUED | OUTPATIENT
Start: 2022-10-21 | End: 2022-10-21 | Stop reason: SDUPTHER

## 2022-10-21 RX ORDER — DIPHENHYDRAMINE HYDROCHLORIDE 50 MG/ML
25 INJECTION INTRAMUSCULAR; INTRAVENOUS EVERY 6 HOURS PRN
Status: ACTIVE | OUTPATIENT
Start: 2022-10-21 | End: 2022-10-22

## 2022-10-21 RX ORDER — HYDRALAZINE HYDROCHLORIDE 20 MG/ML
10 INJECTION INTRAMUSCULAR; INTRAVENOUS
Status: DISCONTINUED | OUTPATIENT
Start: 2022-10-21 | End: 2022-10-21 | Stop reason: HOSPADM

## 2022-10-21 RX ORDER — LABETALOL HYDROCHLORIDE 5 MG/ML
10 INJECTION, SOLUTION INTRAVENOUS
Status: DISCONTINUED | OUTPATIENT
Start: 2022-10-21 | End: 2022-10-21 | Stop reason: HOSPADM

## 2022-10-21 RX ORDER — SODIUM CHLORIDE, SODIUM LACTATE, POTASSIUM CHLORIDE, CALCIUM CHLORIDE 600; 310; 30; 20 MG/100ML; MG/100ML; MG/100ML; MG/100ML
INJECTION, SOLUTION INTRAVENOUS CONTINUOUS
Status: DISCONTINUED | OUTPATIENT
Start: 2022-10-21 | End: 2022-10-21

## 2022-10-21 RX ORDER — NALOXONE HYDROCHLORIDE 0.4 MG/ML
INJECTION, SOLUTION INTRAMUSCULAR; INTRAVENOUS; SUBCUTANEOUS PRN
Status: ACTIVE | OUTPATIENT
Start: 2022-10-21 | End: 2022-10-22

## 2022-10-21 RX ORDER — MORPHINE SULFATE 1 MG/ML
INJECTION, SOLUTION EPIDURAL; INTRATHECAL; INTRAVENOUS PRN
Status: DISCONTINUED | OUTPATIENT
Start: 2022-10-21 | End: 2022-10-21 | Stop reason: SDUPTHER

## 2022-10-21 RX ORDER — TRISODIUM CITRATE DIHYDRATE AND CITRIC ACID MONOHYDRATE 500; 334 MG/5ML; MG/5ML
30 SOLUTION ORAL ONCE
Status: COMPLETED | OUTPATIENT
Start: 2022-10-21 | End: 2022-10-21

## 2022-10-21 RX ORDER — SODIUM CHLORIDE 9 MG/ML
INJECTION, SOLUTION INTRAVENOUS PRN
Status: DISCONTINUED | OUTPATIENT
Start: 2022-10-21 | End: 2022-10-23 | Stop reason: HOSPADM

## 2022-10-21 RX ORDER — ACETAMINOPHEN 500 MG
1000 TABLET ORAL EVERY 8 HOURS PRN
Status: DISCONTINUED | OUTPATIENT
Start: 2022-10-22 | End: 2022-10-22

## 2022-10-21 RX ORDER — OXYCODONE HYDROCHLORIDE 5 MG/1
5 TABLET ORAL EVERY 4 HOURS PRN
Status: DISCONTINUED | OUTPATIENT
Start: 2022-10-22 | End: 2022-10-23 | Stop reason: HOSPADM

## 2022-10-21 RX ORDER — KETOROLAC TROMETHAMINE 30 MG/ML
30 INJECTION, SOLUTION INTRAMUSCULAR; INTRAVENOUS EVERY 6 HOURS PRN
Status: DISPENSED | OUTPATIENT
Start: 2022-10-21 | End: 2022-10-22

## 2022-10-21 RX ORDER — MEPERIDINE HYDROCHLORIDE 25 MG/ML
12.5 INJECTION INTRAMUSCULAR; INTRAVENOUS; SUBCUTANEOUS EVERY 5 MIN PRN
Status: DISCONTINUED | OUTPATIENT
Start: 2022-10-21 | End: 2022-10-21 | Stop reason: HOSPADM

## 2022-10-21 RX ORDER — SODIUM CHLORIDE 9 MG/ML
25 INJECTION, SOLUTION INTRAVENOUS PRN
Status: DISCONTINUED | OUTPATIENT
Start: 2022-10-21 | End: 2022-10-21 | Stop reason: HOSPADM

## 2022-10-21 RX ORDER — BUPIVACAINE HYDROCHLORIDE 7.5 MG/ML
INJECTION, SOLUTION INTRASPINAL PRN
Status: DISCONTINUED | OUTPATIENT
Start: 2022-10-21 | End: 2022-10-21 | Stop reason: SDUPTHER

## 2022-10-21 RX ORDER — DIPHENHYDRAMINE HCL 25 MG
25 TABLET ORAL EVERY 6 HOURS PRN
Status: ACTIVE | OUTPATIENT
Start: 2022-10-21 | End: 2022-10-22

## 2022-10-21 RX ORDER — OXYCODONE HYDROCHLORIDE 5 MG/1
10 TABLET ORAL EVERY 4 HOURS PRN
Status: DISCONTINUED | OUTPATIENT
Start: 2022-10-22 | End: 2022-10-23 | Stop reason: HOSPADM

## 2022-10-21 RX ORDER — MODIFIED LANOLIN
OINTMENT (GRAM) TOPICAL
Status: DISCONTINUED | OUTPATIENT
Start: 2022-10-21 | End: 2022-10-23 | Stop reason: HOSPADM

## 2022-10-21 RX ORDER — ONDANSETRON 2 MG/ML
INJECTION INTRAMUSCULAR; INTRAVENOUS PRN
Status: DISCONTINUED | OUTPATIENT
Start: 2022-10-21 | End: 2022-10-21 | Stop reason: SDUPTHER

## 2022-10-21 RX ORDER — DOCUSATE SODIUM 100 MG/1
100 CAPSULE, LIQUID FILLED ORAL 2 TIMES DAILY
Status: DISCONTINUED | OUTPATIENT
Start: 2022-10-21 | End: 2022-10-23 | Stop reason: HOSPADM

## 2022-10-21 RX ORDER — SODIUM CHLORIDE 0.9 % (FLUSH) 0.9 %
5-40 SYRINGE (ML) INJECTION PRN
Status: DISCONTINUED | OUTPATIENT
Start: 2022-10-21 | End: 2022-10-23 | Stop reason: HOSPADM

## 2022-10-21 RX ORDER — PHENYLEPHRINE HCL IN 0.9% NACL 1 MG/10 ML
SYRINGE (ML) INTRAVENOUS PRN
Status: DISCONTINUED | OUTPATIENT
Start: 2022-10-21 | End: 2022-10-21 | Stop reason: SDUPTHER

## 2022-10-21 RX ADMIN — SODIUM CHLORIDE, POTASSIUM CHLORIDE, SODIUM LACTATE AND CALCIUM CHLORIDE: 600; 310; 30; 20 INJECTION, SOLUTION INTRAVENOUS at 22:38

## 2022-10-21 RX ADMIN — SODIUM CITRATE AND CITRIC ACID MONOHYDRATE 30 ML: 500; 334 SOLUTION ORAL at 17:01

## 2022-10-21 RX ADMIN — SODIUM CHLORIDE, PRESERVATIVE FREE 10 ML: 5 INJECTION INTRAVENOUS at 21:09

## 2022-10-21 RX ADMIN — Medication 100 MCG: at 17:43

## 2022-10-21 RX ADMIN — METHYLERGONOVINE MALEATE 200 MCG: 0.2 INJECTION, SOLUTION INTRAMUSCULAR; INTRAVENOUS at 17:27

## 2022-10-21 RX ADMIN — SODIUM CHLORIDE, POTASSIUM CHLORIDE, SODIUM LACTATE AND CALCIUM CHLORIDE 1000 ML: 600; 310; 30; 20 INJECTION, SOLUTION INTRAVENOUS at 16:45

## 2022-10-21 RX ADMIN — SODIUM CHLORIDE, POTASSIUM CHLORIDE, SODIUM LACTATE AND CALCIUM CHLORIDE: 600; 310; 30; 20 INJECTION, SOLUTION INTRAVENOUS at 16:14

## 2022-10-21 RX ADMIN — SODIUM CHLORIDE, POTASSIUM CHLORIDE, SODIUM LACTATE AND CALCIUM CHLORIDE: 600; 310; 30; 20 INJECTION, SOLUTION INTRAVENOUS at 14:14

## 2022-10-21 RX ADMIN — Medication 100 MCG: at 17:30

## 2022-10-21 RX ADMIN — BUPIVACAINE HYDROCHLORIDE 1.6 ML: 7.5 INJECTION, SOLUTION SUBARACHNOID at 17:09

## 2022-10-21 RX ADMIN — Medication 100 MCG: at 17:24

## 2022-10-21 RX ADMIN — WATER 2000 MG: 1 INJECTION INTRAMUSCULAR; INTRAVENOUS; SUBCUTANEOUS at 17:01

## 2022-10-21 RX ADMIN — Medication 100 MCG: at 17:16

## 2022-10-21 RX ADMIN — ONDANSETRON 4 MG: 2 INJECTION INTRAMUSCULAR; INTRAVENOUS at 17:27

## 2022-10-21 RX ADMIN — Medication 100 MCG: at 17:11

## 2022-10-21 RX ADMIN — SODIUM CHLORIDE, POTASSIUM CHLORIDE, SODIUM LACTATE AND CALCIUM CHLORIDE: 600; 310; 30; 20 INJECTION, SOLUTION INTRAVENOUS at 17:16

## 2022-10-21 RX ADMIN — MORPHINE SULFATE 0.15 MG: 1 INJECTION, SOLUTION EPIDURAL; INTRATHECAL; INTRAVENOUS at 17:09

## 2022-10-21 RX ADMIN — HYDROMORPHONE HYDROCHLORIDE 0.25 MG: 0.5 INJECTION, SOLUTION INTRAMUSCULAR; INTRAVENOUS; SUBCUTANEOUS at 19:14

## 2022-10-21 RX ADMIN — Medication 100 MCG: at 17:34

## 2022-10-21 RX ADMIN — Medication 100 MCG: at 17:18

## 2022-10-21 RX ADMIN — Medication 100 MCG: at 17:14

## 2022-10-21 ASSESSMENT — PAIN SCALES - GENERAL: PAINLEVEL_OUTOF10: 6

## 2022-10-21 ASSESSMENT — PAIN DESCRIPTION - LOCATION: LOCATION: ABDOMEN

## 2022-10-21 ASSESSMENT — LIFESTYLE VARIABLES: SMOKING_STATUS: 0

## 2022-10-21 NOTE — ANESTHESIA PROCEDURE NOTES
Spinal Block    Patient location during procedure: OB  End time: 10/21/2022 5:08 PM  Reason for block: primary anesthetic and at surgeon's request  Staffing  Performed: resident/CRNA   Anesthesiologist: Pam Ortzi MD  Resident/CRNA: JUSTIN Salgado CRNA  Spinal Block  Patient position: sitting  Prep: Betadine  Patient monitoring: cardiac monitor, continuous pulse ox, continuous capnometry and frequent blood pressure checks  Approach: midline  Location: L3/L4  Provider prep: mask, sterile gloves and sterile gown  Local infiltration: lidocaine  Needle  Needle type: Pencan   Needle gauge: 24 G  Needle length: 3.5 in  Assessment  Sensory level: T4  Swirl obtained: Yes  CSF: clear  Attempts: 1  Hemodynamics: stable  Preanesthetic Checklist  Completed: patient identified, IV checked, site marked, risks and benefits discussed, surgical/procedural consents, equipment checked, pre-op evaluation, timeout performed, anesthesia consent given, oxygen available, monitors applied/VS acknowledged, fire risk safety assessment completed and verbalized and blood product R/B/A discussed and consented

## 2022-10-21 NOTE — PROGRESS NOTES
@ 38 weeks and 3 days. Sent from office for amniotic bands.  today at 1800. Patient denies leaking of fluids, vaginal bleeding, ctx. +FM.

## 2022-10-21 NOTE — ANESTHESIA PRE PROCEDURE
Department of Anesthesiology  Preprocedure Note       Name:  Conrado Winkler   Age:  27 y.o.  :  1990                                          MRN:  [id number]         Date:  10/21/2022      Surgeon: Walter Montemayor): Raul Levine MD    Procedure: Procedure(s):   SECTION    Medications prior to admission:   Prior to Admission medications    Medication Sig Start Date End Date Taking?  Authorizing Provider   aspirin 81 MG chewable tablet Take 81 mg by mouth daily    Historical Provider, MD   Prenatal Vit-Fe Fumarate-FA (PRENATAL PLUS) 27-1 MG TABS Take 1 tablet by mouth daily 3/31/22   Raul Levine MD       Current medications:    Current Facility-Administered Medications   Medication Dose Route Frequency Provider Last Rate Last Admin    lactated ringers infusion   IntraVENous Continuous Raul Levine  mL/hr at 10/21/22 1414 New Bag at 10/21/22 1414    lactated ringers bolus  1,000 mL IntraVENous Once Raul Levine MD        0.9 % sodium chloride infusion   IntraVENous PRN Raul Levine MD        citric acid-sodium citrate (BICITRA) solution 30 mL  30 mL Oral Once Raul Levine MD        ceFAZolin (ANCEF) 2,000 mg in sterile water 20 mL IV syringe  2,000 mg IntraVENous Once Raul Levine MD           Allergies:  No Known Allergies    Problem List:    Patient Active Problem List   Diagnosis Code    Palpitations R00.2    20 weeks gestation of pregnancy Z3A.20    Third pregnancy Z34.90    Subchorionic hematoma in second trimester O41.8X20, O46.8X2    COVID-19 affecting pregnancy in second trimester O98.512, U07.1    Cramping affecting pregnancy, antepartum O26.899, R10.9    COVID-19 affecting pregnancy in third trimester O98.513, U5.3    37 weeks gestation of pregnancy Z3A.37    38 weeks gestation of pregnancy Z3A.38       Past Medical History:        Diagnosis Date    Antepartum bleeding, second trimester 2022    Ovarian cyst        Past Surgical History: Procedure Laterality Date    UMBILICAL HERNIA REPAIR  2015       Social History:    Social History     Tobacco Use    Smoking status: Never    Smokeless tobacco: Never   Substance Use Topics    Alcohol use:  No                                Counseling given: Not Answered      Vital Signs (Current):   Vitals:    10/21/22 1339   BP: 124/73   Pulse: 95   Resp: 16   Temp: 36.8 °C (98.3 °F)   TempSrc: Oral   SpO2: 99%                                              BP Readings from Last 3 Encounters:   10/21/22 124/73   10/20/22 130/84   10/17/22 128/78       NPO Status: Time of last liquid consumption: 1100                        Time of last solid consumption: 1100                        Date of last liquid consumption: 10/21/22                        Date of last solid food consumption: 10/21/22    BMI:   Wt Readings from Last 3 Encounters:   10/20/22 211 lb (95.7 kg)   10/17/22 211 lb (95.7 kg)   10/13/22 209 lb (94.8 kg)     There is no height or weight on file to calculate BMI.    CBC:   Lab Results   Component Value Date/Time    WBC 8.4 10/21/2022 01:40 PM    RBC 4.49 10/21/2022 01:40 PM    HGB 13.3 10/21/2022 01:40 PM    HCT 38.5 10/21/2022 01:40 PM    MCV 85.7 10/21/2022 01:40 PM    RDW 13.2 10/21/2022 01:40 PM     10/21/2022 01:40 PM       CMP:   Lab Results   Component Value Date/Time     04/27/2022 01:45 PM    K 3.9 04/27/2022 01:45 PM    K 4.0 02/04/2022 11:47 AM     04/27/2022 01:45 PM    CO2 24 04/27/2022 01:45 PM    BUN 8 04/27/2022 01:45 PM    CREATININE 0.6 04/27/2022 01:45 PM    GFRAA >60 04/27/2022 01:45 PM    LABGLOM >60 04/27/2022 01:45 PM    GLUCOSE 98 04/27/2022 01:45 PM    PROT 6.9 11/14/2014 08:20 PM    CALCIUM 9.3 04/27/2022 01:45 PM    BILITOT 0.3 11/14/2014 08:20 PM    ALKPHOS 61 11/14/2014 08:20 PM    AST 24 11/14/2014 08:20 PM    ALT 24 11/14/2014 08:20 PM       POC Tests: No results for input(s): POCGLU, POCNA, POCK, POCCL, POCBUN, POCHEMO, POCHCT in the last 72 hours.    Coags: No results found for: PROTIME, INR, APTT    HCG (If Applicable):   Lab Results   Component Value Date    PREGTESTUR + 03/31/2022        ABGs: No results found for: PHART, PO2ART, VTK8FWX, MVO2KBW, BEART, S9QOXPZJ     Type & Screen (If Applicable):  No results found for: LABABO, LABRH    Drug/Infectious Status (If Applicable):  No results found for: HIV, HEPCAB    COVID-19 Screening (If Applicable): No results found for: COVID19        Anesthesia Evaluation  Patient summary reviewed and Nursing notes reviewed  Airway: Mallampati: II  TM distance: >3 FB   Neck ROM: full  Mouth opening: > = 3 FB   Dental: normal exam         Pulmonary:Negative Pulmonary ROS and normal exam  breath sounds clear to auscultation      (-) not a current smoker          Patient did not smoke on day of surgery. Cardiovascular:Negative CV ROS  Exercise tolerance: good (>4 METS),           Rhythm: regular  Rate: normal           Beta Blocker:  Not on Beta Blocker         Neuro/Psych:   Negative Neuro/Psych ROS              GI/Hepatic/Renal:   (+) morbid obesity          Endo/Other: Negative Endo/Other ROS                    Abdominal:             Vascular: negative vascular ROS. Other Findings:           Anesthesia Plan      general, spinal and epidural     ASA 2           MIPS: Postoperative opioids intended and Prophylactic antiemetics administered. Anesthetic plan and risks discussed with patient and spouse.                   CBC   Lab Results   Component Value Date/Time    WBC 8.4 10/21/2022 01:40 PM    RBC 4.49 10/21/2022 01:40 PM    HGB 13.3 10/21/2022 01:40 PM    HCT 38.5 10/21/2022 01:40 PM    MCV 85.7 10/21/2022 01:40 PM    RDW 13.2 10/21/2022 01:40 PM     10/21/2022 01:40 PM     CMP    Lab Results   Component Value Date/Time     04/27/2022 01:45 PM    K 3.9 04/27/2022 01:45 PM    K 4.0 02/04/2022 11:47 AM     04/27/2022 01:45 PM    CO2 24 04/27/2022 01:45 PM    BUN 8 04/27/2022 01:45 PM    CREATININE 0.6 04/27/2022 01:45 PM    GFRAA >60 04/27/2022 01:45 PM    LABGLOM >60 04/27/2022 01:45 PM    GLUCOSE 98 04/27/2022 01:45 PM    PROT 6.9 11/14/2014 08:20 PM    CALCIUM 9.3 04/27/2022 01:45 PM    BILITOT 0.3 11/14/2014 08:20 PM    ALKPHOS 61 11/14/2014 08:20 PM    AST 24 11/14/2014 08:20 PM    ALT 24 11/14/2014 08:20 PM     BMP    Lab Results   Component Value Date/Time     04/27/2022 01:45 PM    K 3.9 04/27/2022 01:45 PM    K 4.0 02/04/2022 11:47 AM     04/27/2022 01:45 PM    CO2 24 04/27/2022 01:45 PM    BUN 8 04/27/2022 01:45 PM    CREATININE 0.6 04/27/2022 01:45 PM    CALCIUM 9.3 04/27/2022 01:45 PM    GFRAA >60 04/27/2022 01:45 PM    LABGLOM >60 04/27/2022 01:45 PM    GLUCOSE 98 04/27/2022 01:45 PM     POCGlucose  No results for input(s): GLUCOSE in the last 72 hours. Coags  No results found for: PROTIME, INR, APTT    HCG (If Applicable)   Lab Results   Component Value Date    PREGTESTUR + 03/31/2022        ABGs   No results found for: PHART, PO2ART, AKE9WOU, MVX3DOH, BEART, J9XOWMWA     Type & Screen (If Applicable)  Lab Results   Component Value Date    ABORH O POS 04/27/2022     Active Problem List with ICD10 Codes  Patient Active Problem List   Diagnosis Code    Palpitations R00.2    20 weeks gestation of pregnancy Z3A.20    Third pregnancy Z34.90    Subchorionic hematoma in second trimester O41.8X20, O46.8X2    COVID-19 affecting pregnancy in second trimester O98.512, U07.1    Cramping affecting pregnancy, antepartum O26.899, R10.9    COVID-19 affecting pregnancy in third trimester O98.513, U5.3    37 weeks gestation of pregnancy Z3A.37    38 weeks gestation of pregnancy Z3A.38       JUSTIN Ladd CRNA  October 21, 2022  3:00 PM      JUSTIN Ladd CRNA   10/21/2022    Patient seen and evaluated.   Jonathan Garcia MD  10/21/22

## 2022-10-21 NOTE — PROCEDURES
Luna Swift is a 27 y.o. female patient. No diagnosis found. Past Medical History:   Diagnosis Date    Antepartum bleeding, second trimester 2022    Ovarian cyst      Blood pressure 124/73, pulse 95, temperature 98.3 °F (36.8 °C), temperature source Oral, resp. rate 16, last menstrual period 2022, SpO2 99 %, not currently breastfeeding. Procedures  PREOPERATIVE DIAGNOSES:     1 38!/2 weeks @ intrauterine pregnancy. 2.  multiple amniotic bands   Patient choice       POSTOPERATIVE DIAGNOSES:     Same. PROCEDURE: primary low transverse  section        SURGEON: Tete Park MD       ASSISTANT:       ESTIMATED BLOOD LOSS:  414SF        COMPLICATIONS:  None. ANESTHESIA:   Spinal anesthesia        FINDINGS:   Live Born  Sex:  Male  Fetal Position:  Cephalic, left occiput anterior  Apgars:  1 minute: 8; 5 minute: 9  Weight:  7 pounds, 15 ounces  Tubes, uterus, ovaries:  normal          DETAILS OF PROCEDURE:    The patient was taken to the operating room where Spinal anesthesia was administered and found to be adequate. Abdomen was prepped   and draped in the normal sterile fashion. Lara catheter had previously been   placed. Pfannenstiel skin incision made with a scalpel, carried down to the fascia with cautery. The fascia was nicked in the midline and extended laterally with   cautery. Muscles were  in the midline. Peritoneum was grasped with   hemostats, tented up, and entered sharply. Peritoneal incision was extended   superiorly and inferiorly with good visualization of bladder. Delee bladder blade was introduced. Bladder flap was created with sharp dissection. Low transverse uterine incision was made with a scalpel and extended bluntly. Membranes ruptured for Clear fluid. A viable male infant was delivered in the cephalic presentation without diffiuclty. Baby was bulb suctioned on the abdomen. Cord was clamped and cut, and handed to waiting R.N.  Cord gases and blood were obtained. Placenta was manually extracted with 3 vessel cord. Uterus was exteriorized, cleared of all clot and debris. Uterine incision   was closed with vicryl in a running locked fashion. Good hemostasis was   noted. Uterus, tubes, and ovaries appeared normal. Uterus was returned to   the pelvis. The pelvis was irrigated, cleared of all clot and debris. Fascia was closed with 0 stratophyx  in a running fashion. Subcutaneous tissue was irrigated, made hemostatic. Skin was closed with absorbable subcutaneous staples. Baby and mom to recovery room in stable condition. Placenta to pathology: Yes.     MD Daniel Garcia MD  10/21/2022

## 2022-10-21 NOTE — PROGRESS NOTES
PreOp DX:  38w3d Pregnancy     Multiple amniotic bands         Post OP Dx:  Same + live male                             Procedure:   section / Assistance     Anesthesia:  Spinal         Under spinal anesthesia the abdomen was prepared and draped . In the absence of a resident I assisted  Dr. Kendra Villa who performed a  section, with retraction, exposure and delivery of a live infant and suture management/cutting throughout the case. Then the uterus and the abdomen were closed. Procedure was well tolerated.

## 2022-10-22 VITALS
RESPIRATION RATE: 18 BRPM | SYSTOLIC BLOOD PRESSURE: 99 MMHG | OXYGEN SATURATION: 100 % | TEMPERATURE: 98.6 F | HEART RATE: 77 BPM | DIASTOLIC BLOOD PRESSURE: 55 MMHG

## 2022-10-22 LAB
HCT VFR BLD CALC: 33.8 % (ref 34–48)
HEMOGLOBIN: 11.5 G/DL (ref 11.5–15.5)
MCH RBC QN AUTO: 29.7 PG (ref 26–35)
MCHC RBC AUTO-ENTMCNC: 34 % (ref 32–34.5)
MCV RBC AUTO: 87.3 FL (ref 80–99.9)
PDW BLD-RTO: 13.1 FL (ref 11.5–15)
PLATELET # BLD: 140 E9/L (ref 130–450)
PMV BLD AUTO: 11.8 FL (ref 7–12)
RBC # BLD: 3.87 E12/L (ref 3.5–5.5)
WBC # BLD: 11.5 E9/L (ref 4.5–11.5)

## 2022-10-22 PROCEDURE — 6370000000 HC RX 637 (ALT 250 FOR IP): Performed by: OBSTETRICS & GYNECOLOGY

## 2022-10-22 PROCEDURE — 85027 COMPLETE CBC AUTOMATED: CPT

## 2022-10-22 PROCEDURE — 36415 COLL VENOUS BLD VENIPUNCTURE: CPT

## 2022-10-22 PROCEDURE — 6370000000 HC RX 637 (ALT 250 FOR IP): Performed by: ANESTHESIOLOGY

## 2022-10-22 PROCEDURE — 6360000002 HC RX W HCPCS: Performed by: ANESTHESIOLOGY

## 2022-10-22 PROCEDURE — 2580000003 HC RX 258: Performed by: OBSTETRICS & GYNECOLOGY

## 2022-10-22 PROCEDURE — 1220000000 HC SEMI PRIVATE OB R&B

## 2022-10-22 RX ORDER — ACETAMINOPHEN 500 MG
1000 TABLET ORAL EVERY 8 HOURS PRN
Status: DISCONTINUED | OUTPATIENT
Start: 2022-10-22 | End: 2022-10-23 | Stop reason: HOSPADM

## 2022-10-22 RX ADMIN — SODIUM CHLORIDE, PRESERVATIVE FREE 10 ML: 5 INJECTION INTRAVENOUS at 10:26

## 2022-10-22 RX ADMIN — DOCUSATE SODIUM 100 MG: 100 CAPSULE, LIQUID FILLED ORAL at 19:57

## 2022-10-22 RX ADMIN — SODIUM CHLORIDE, PRESERVATIVE FREE 10 ML: 5 INJECTION INTRAVENOUS at 09:12

## 2022-10-22 RX ADMIN — ACETAMINOPHEN 1000 MG: 500 TABLET ORAL at 18:38

## 2022-10-22 RX ADMIN — KETOROLAC TROMETHAMINE 30 MG: 30 INJECTION, SOLUTION INTRAMUSCULAR at 10:27

## 2022-10-22 RX ADMIN — KETOROLAC TROMETHAMINE 30 MG: 30 INJECTION, SOLUTION INTRAMUSCULAR at 00:12

## 2022-10-22 RX ADMIN — OXYCODONE 10 MG: 5 TABLET ORAL at 19:56

## 2022-10-22 RX ADMIN — DOCUSATE SODIUM 100 MG: 100 CAPSULE, LIQUID FILLED ORAL at 09:09

## 2022-10-22 RX ADMIN — OXYCODONE 5 MG: 5 TABLET ORAL at 05:35

## 2022-10-22 ASSESSMENT — PAIN SCALES - GENERAL
PAINLEVEL_OUTOF10: 2
PAINLEVEL_OUTOF10: 6
PAINLEVEL_OUTOF10: 4
PAINLEVEL_OUTOF10: 4
PAINLEVEL_OUTOF10: 7

## 2022-10-22 ASSESSMENT — PAIN DESCRIPTION - DESCRIPTORS
DESCRIPTORS: CRAMPING;DISCOMFORT;SORE
DESCRIPTORS: CRAMPING;DISCOMFORT;SORE

## 2022-10-22 ASSESSMENT — PAIN DESCRIPTION - LOCATION
LOCATION: ABDOMEN
LOCATION: ABDOMEN

## 2022-10-22 ASSESSMENT — PAIN - FUNCTIONAL ASSESSMENT
PAIN_FUNCTIONAL_ASSESSMENT: ACTIVITIES ARE NOT PREVENTED
PAIN_FUNCTIONAL_ASSESSMENT: ACTIVITIES ARE NOT PREVENTED

## 2022-10-22 ASSESSMENT — PAIN DESCRIPTION - ORIENTATION: ORIENTATION: LOWER

## 2022-10-22 NOTE — FLOWSHEET NOTE
Pt admitted to room 313. Oriented to room, phones, call light and bedside educational material. Pt informed of visitation policy. Pt informed only one adult over age 25 is allowed to stay/sleep in room overnight. Pt informed of pain medicine available as needed. Infant safety reviewed. IV's infusing as per order. Lara catheter patent and draining clear yellow urine. PO clear liquids encouraged. PCD's on.

## 2022-10-22 NOTE — FLOWSHEET NOTE
Lara catheter discontinued. Drained 925cc clear yellow urine. Pt dangled at bedside briefly, then assisted to bathroom. Ray care done. Pt instructed on ray care. Lochia small amount. No clots. Pt back to bed. Pt ambulates well. Pt to call for assist to bathroom with first void. Pt encouraged to walk in [place] later today.

## 2022-10-22 NOTE — LACTATION NOTE
Pt is a multip with prior BF experience. Baby in NN for circumcision at this time. Mother denies concerns. Pt has EBP for personal use once home. Encouraged skin to skin and frequent attempts at breast to stimulate milk production. Instructed on normal infant behavior in the first 12-24 hours and importance of stimulating the baby frequently to eat during this time. Reviewed hand expression, and encouraged to hand express drops of colostrum when baby is sleepy. Instructed that baby may also feed 8-12 times a day- cluster feeding at times- as her milk supply is being established. Instructed on benefits of skin to skin and avoidance of pacifier / artificial nipple use until breastfeeding is well established. Educated on making sure infant has an open airway while breastfeeding and skin to skin. Instructed on hunger cues and waking techniques to try. Reviewed signs of adequate I & O; allow baby to feed ad lorraine and not to limit time at breast. Breastfeeding booklet provided with review of its contents. Encouraged to call with any concerns.

## 2022-10-22 NOTE — PLAN OF CARE
Problem: Pain  Goal: Verbalizes/displays adequate comfort level or baseline comfort level  Outcome: Progressing     Problem: Vaginal Birth or  Section  Goal: Fetal and maternal status remain reassuring during the birth process  Description:  Birth OB-Pregnancy care plan goal which identifies if the fetal and maternal status remain reassuring during the birth process  Outcome: Completed     Problem: Postpartum  Goal: Experiences normal postpartum course  Description:  Postpartum OB-Pregnancy care plan goal which identifies if the mother is experiencing a normal postpartum course  Outcome: Progressing  Goal: Appropriate maternal -  bonding  Description:  Postpartum OB-Pregnancy care plan goal which identifies if the mother and  are bonding appropriately  Outcome: Progressing  Goal: Establishment of infant feeding pattern  Description:  Postpartum OB-Pregnancy care plan goal which identifies if the mother is establishing a feeding pattern with their   Outcome: Progressing  Goal: Incisions, wounds, or drain sites healing without S/S of infection  Outcome: Progressing     Problem: Infection - Adult  Goal: Absence of infection at discharge  Outcome: Progressing  Goal: Absence of infection during hospitalization  Outcome: Progressing  Goal: Absence of fever/infection during anticipated neutropenic period  Outcome: Progressing     Problem: Safety - Adult  Goal: Free from fall injury  Outcome: Progressing     Problem: Discharge Planning  Goal: Discharge to home or other facility with appropriate resources  Outcome: Progressing

## 2022-10-22 NOTE — ANESTHESIA POSTPROCEDURE EVALUATION
Department of Anesthesiology  Postprocedure Note    Patient: Marianela Stringer  MRN: [id number]  YOB: 1990  Date of evaluation: 10/22/2022      Procedure Summary     Date: 10/21/22 Room / Location: [facility]  [address] [place] [address]    Anesthesia Start:  Anesthesia Stop: 7883    Procedure:  SECTION (Uterus) Diagnosis:       Fleming infant of 45 completed weeks of gestation      (amniotic bands)    Surgeons: Agapito Henderson MD Responsible Provider: Pam Ortiz MD    Anesthesia Type: Spinal ASA Status: 2          Anesthesia Type: Spinal    Lora Phase I: Lora Score: 9    Lora Phase II: Lora Score: 10      Anesthesia Post Evaluation    Patient location during evaluation: bedside  Patient participation: complete - patient participated  Level of consciousness: awake and alert  Pain score: 0  Airway patency: patent  Nausea & Vomiting: no nausea and no vomiting  Complications: no  Cardiovascular status: blood pressure returned to baseline  Respiratory status: acceptable  Hydration status: euvolemic

## 2022-10-22 NOTE — PROGRESS NOTES
Progress Note    SUBJECTIVE: patient status post c section delivery day 1 doing well , normal postpartum course. Accepted level of pain    OBJECTIVE:    VITALS:  BP (!) 91/52   Pulse 70   Temp 98 °F (36.7 °C) (Oral)   Resp 18   LMP 01/29/2022   SpO2 95%   Breastfeeding Unknown   Physical Exam  lung:cta  Heart : regular rythm  Abdomen:soft  Appropriate tendernessr ,firm uterus ,bs present,incision clear and dry.   Extremities :normal no evidence of DVT  DATA:  CBC:   Lab Results   Component Value Date/Time    WBC 11.5 10/22/2022 05:38 AM    RBC 3.87 10/22/2022 05:38 AM    HGB 11.5 10/22/2022 05:38 AM    HCT 33.8 10/22/2022 05:38 AM    MCV 87.3 10/22/2022 05:38 AM    MCH 29.7 10/22/2022 05:38 AM    MCHC 34.0 10/22/2022 05:38 AM    RDW 13.1 10/22/2022 05:38 AM     10/22/2022 05:38 AM    MPV 11.8 10/22/2022 05:38 AM       ASSESSMENT AND PLAN:  Patient Active Problem List   Diagnosis    Palpitations    20 weeks gestation of pregnancy    Third pregnancy    Subchorionic hematoma in second trimester    COVID-19 affecting pregnancy in second trimester    Cramping affecting pregnancy, antepartum    COVID-19 affecting pregnancy in third trimester    37 weeks gestation of pregnancy    38 weeks gestation of pregnancy       Normal post partum care   Anticipate discharge home in  24 hours

## 2022-10-22 NOTE — PROGRESS NOTES
[facility] [place] [facility] screening results were discussed with parent. Questions answered. Brochure given to parent. Advised to monitor developmental milestones and contact physician for any concerns.    Nely Torres

## 2022-10-23 VITALS
RESPIRATION RATE: 14 BRPM | TEMPERATURE: 98.7 F | HEART RATE: 92 BPM | SYSTOLIC BLOOD PRESSURE: 106 MMHG | OXYGEN SATURATION: 100 % | DIASTOLIC BLOOD PRESSURE: 60 MMHG

## 2022-10-23 PROCEDURE — 6370000000 HC RX 637 (ALT 250 FOR IP): Performed by: OBSTETRICS & GYNECOLOGY

## 2022-10-23 RX ORDER — BISACODYL 10 MG
10 SUPPOSITORY, RECTAL RECTAL ONCE
Status: COMPLETED | OUTPATIENT
Start: 2022-10-23 | End: 2022-10-23

## 2022-10-23 RX ORDER — OXYCODONE HYDROCHLORIDE 5 MG/1
5 TABLET ORAL EVERY 6 HOURS PRN
Qty: 12 TABLET | Refills: 0 | Status: SHIPPED | OUTPATIENT
Start: 2022-10-23 | End: 2022-10-26

## 2022-10-23 RX ADMIN — ACETAMINOPHEN 1000 MG: 500 TABLET ORAL at 02:44

## 2022-10-23 RX ADMIN — DOCUSATE SODIUM 100 MG: 100 CAPSULE, LIQUID FILLED ORAL at 09:05

## 2022-10-23 RX ADMIN — OXYCODONE 10 MG: 5 TABLET ORAL at 10:22

## 2022-10-23 RX ADMIN — OXYCODONE 5 MG: 5 TABLET ORAL at 06:05

## 2022-10-23 RX ADMIN — BISACODYL 10 MG: 10 SUPPOSITORY RECTAL at 10:24

## 2022-10-23 ASSESSMENT — PAIN DESCRIPTION - DESCRIPTORS: DESCRIPTORS: CRAMPING;SORE;TENDER

## 2022-10-23 ASSESSMENT — PAIN SCALES - GENERAL
PAINLEVEL_OUTOF10: 5
PAINLEVEL_OUTOF10: 5
PAINLEVEL_OUTOF10: 7

## 2022-10-23 ASSESSMENT — PAIN - FUNCTIONAL ASSESSMENT: PAIN_FUNCTIONAL_ASSESSMENT: ACTIVITIES ARE NOT PREVENTED

## 2022-10-23 ASSESSMENT — PAIN DESCRIPTION - ORIENTATION: ORIENTATION: LOWER

## 2022-10-23 ASSESSMENT — PAIN DESCRIPTION - LOCATION: LOCATION: ABDOMEN

## 2022-10-23 NOTE — LACTATION NOTE
Pt denies  concerns surrounding breastfeeding. Encouraged frequent feeds at breast, hand expression and skin to skin to establish supply. Support provided and encouraged to call with any needs.

## 2022-10-23 NOTE — PROGRESS NOTES
SUBJECTIVE:   Patient without complaint    OBJECTIVE:   BP (!) 109/59   Pulse 93   Temp 98.7 °F (37.1 °C) (Oral)   Resp 16   LMP 01/29/2022   SpO2 97%   Breastfeeding Unknown    Incision clean, dry, intact   Abdomen soft, nontender    ASSESSMENT/PLAN:   Postoperative Day #2   Discharge home with precaution after bowel movement   Follow-up 1 week in office   Post operative precautions given       Zay Franklin MD 10/23/2022 7:42 AM

## 2022-10-23 NOTE — FLOWSHEET NOTE
Discharge teaching information for pt and infant reviewed with pt. Pt verbalizes understanding of information.

## 2022-10-23 NOTE — DISCHARGE INSTRUCTIONS
Follow-up with your OB doctor in 2 week if  delivery unless otherwise instructed. Call office for an appointment. For breastfeeding support, you can contact our lactation specialists at [phone] or [phone]    DIET  Eat a well balanced diet focusing on foods high in fiber and protein  Drink plenty of fluids especially water. To avoid constipation you may take a mild stool softener as recommended by your doctor or midwife. ACTIVITY  Gradually increase your activity. Resume exercise regimen only after advised by your doctor or midwife. Avoid lifting anything heavier than your baby or a gallon of milk for SIX weeks. Avoid driving until your doctor or midwife has given their approval.  Khushi Ke slowly from a lying to sitting and then a standing position. Climb stairs one at a time. Use caution when carrying your baby up and down the stairs. No sexual activity for 6 weeks or until advised by your doctor - Nothing in vagina: intercourse, tampons, or douching. Be prepared to discuss family planning at your follow-up OB visit. You may feel tired or have a lack of energy. You may continue your prenatal vitamin to replenish nutrients post delivery. Nap when baby naps to catch up on sleep. May return to work or school in 6 weeks or as directed by OB. EMOTIONS  You may feed byrd, sad, teary, & overwhelmed. Contact your OB provider if you feel you may be showing signs of postpartum depression, or have thoughts of harming yourself or your infant. If infant will not stop crying, contact another adult for help or place infant in their crib on their back and take a break. NEVER shake your infant. BLEEDING  Vaginal bleeding will decrease in amount over the next few weeks. You will notice that as your activity increases, your flow may increase. This is your body's way of telling you, you need to take things easier and rest more often.   Call your OB/ER if you are saturating more than one maxi pad in an hour. BREAST CARE  Take medications as recommended by your doctor or midwife for pain  If you develop a warm, red, tender area on your breast or develop a fever contact your OB provider. For breastfeeding moms:  If you become engorged, feeding may be more difficult or painful for 1-2 days. You may find it helpful to hand express some milk so that the infant can latch on more easily. While breastfeeding, continue to take your prenatal vitamins as directed by your doctor or midwife. Only take medications verified as safe for breastfeeding. For non-breastfeeding moms:  You may apply ice packs to your breasts over your bra for twenty minutes at a time for comfort. Avoid stimulation to your breasts, when showering allow the water to strike your back not your breasts. Wear a good fitting bra until your milk dries, such as a sports bra. INCISIONAL CARE / EBEN CARE  Clean your incision in the shower with mild soap. After shower pat the incision area dry and leave open to air. If used, Steri-stipes should be removed by 2 weeks. If used, Sherryle Phy should be removed by the OB in office by 1 week. If used/ordered, an abdominal binder may provide support for your incision. Use the eben-bottle after toileting until bleeding stops. Cleanse your perineum from front to back  If used, stitches or internal clips will dissolve in 4-6 weeks. You may use a sitz bath or soak in a clean tub as needed for comfort. Kegel exercises will help restore bladder control. SWELLING  Keep your legs elevated when sitting or lying. When wearing stocking or socks, make sure they are not too tight. WHEN TO CALL THE DOCTOR  If you have a temp of 100.4 or more. If your bleeding has increased and you are saturating a pad in an hour. Your abdomen is tender to touch. You are passing blood clots bigger than the size of a lemon. If you are experiencing extreme weakness or dizziness.    If you are having flu-like symptoms such as achy muscles or joints. There is a foul smell or a green color to your vaginal bleeding. If you have pain that cannot be relieved. You have persistent burning with urination or frequency. Call if you have concerns about your well-being. You are unable to sleep, eat, or are having thoughts of harming yourself or your baby. You have swelling, bleeding, drainage, foul odor, redness, or warmth in/around your incision or stitches. You have a red, warm, tender area in you calf. Breastfeeding: Care Instructions  Overview     Breastfeeding has many benefits. It may lower your baby's chances of getting an infection. It also may make it less likely that your baby will have problems such as diabetes and obesity later in life. Breastfeeding also helps you bond with your baby. In the first days after birth, your breasts make a thick, yellow liquid called colostrum. This liquid gives your baby nutrients and antibodies against infection. It is all that babies need in the first days after birth. Your breasts will fill with milk a few days after the birth. Breastfeeding is a skill that gets better with practice. Be patient with yourself and your baby. If you have trouble, you can get help and keep breastfeeding. Follow-up care is a key part of your treatment and safety. Be sure to make and go to all appointments, and call your doctor if you are having problems. It's also a good idea to know your test results and keep a list of the medicines you take. How can you care for yourself at home? Breastfeed your baby whenever your baby is hungry. In the first 2 weeks, your baby will breastfeed at least 8 times in a 24-hour period. This will help you keep up your supply of milk. Signs that your baby is hungry include:  Sucking on their hands. Early their lips.   Turning their head toward your breast.  Put a bed pillow or a nursing pillow on your lap to support your arms and your baby.  Cande Bors your baby in a comfortable position. You can hold your baby in several ways. One of the most common positions is the cradle hold. One arm supports your baby, with your baby's head in the bend of your elbow. Your open hand supports your baby's bottom or back. Your baby's belly lies against yours. If you had your baby by , or , try the football hold. This position keeps your baby off your belly. Tuck your baby under your arm, with your baby's body along the side you will be feeding on. Support your baby's upper body with your arm. With that hand you can control your baby's head to bring their mouth to your breast.  Try different positions with each feeding. If you are having problems, ask for help from your doctor or a lactation consultant. To get your baby to latch on:  Support and narrow your breast with one hand using a \"U hold,\" with your thumb on the outer side of your breast and your fingers on the inner side. You can also use a \"C hold,\" with all your fingers below the nipple and your thumb above it. Try the different holds to get the deepest latch for whichever breastfeeding position you use. Your other arm is behind your baby's back, with your hand supporting the base of the baby's head. Position your fingers and thumb to point toward your baby's ears. You can touch your baby's upper lip right under the nose lower with your nipple to get your baby's mouth to open. Wait until your baby opens up really wide, like a big yawn. Then be sure to bring the baby quickly to your breast--not your breast to the baby. As you bring your baby toward your breast, use your other hand to support the breast and guide it into your baby's mouth. Both the nipple and a large portion of the darker area around the nipple (areola) should be in the baby's mouth. The baby's lips should be flared outward, not folded in (inverted).   Listen for a regular sucking and swallowing pattern while the baby is feeding. If you can't see or hear a swallowing pattern, watch the baby's ears. They will wiggle slightly when the baby swallows. If the baby's nose appears to be blocked by your breast, bring your baby's body closer to you. This will help tilt the baby's head back slightly, so just the edge of one nostril is clear for breathing. When your baby is latched, you can usually remove your hand from supporting your breast and use it to cradle under your baby. Now just relax and breastfeed your baby. You will know that your baby is feeding well when: Your baby's mouth covers a lot of the areola, and the lips are flared out. Your baby's chin and nose rest against your breast.  Sucking is deep and rhythmic, with short pauses. You are able to see and hear your baby swallowing. You do not feel pain in your nipple. Offer both breasts to your baby at each feeding. Each time you breastfeed, switch which breast you start with. Anytime you need to remove your baby from the breast, put one finger in the corner of your baby's mouth. Push your finger between your baby's gums to gently break the seal. If you don't break the tight seal before you remove your baby, your nipples can become sore, cracked, or bruised. After you finish feeding, gently pat your baby's back to let out any swallowed air. After your baby burps, offer the breast again, or offer the other breast. Sometimes a baby will want to keep feeding after being burped. When should you call for help? Call your doctor now or seek immediate medical care if:    You have symptoms of a breast infection, such as: Increased pain, swelling, redness, or warmth around a breast.  Red streaks extending from the breast.  Pus draining from a breast.  A fever. Your baby has no wet diapers for 6 hours.    Watch closely for changes in your health, and be sure to contact your doctor if:    Your baby has trouble latching on to your breast.     You continue to have pain or discomfort when breastfeeding. You have other questions or concerns. Where can you learn more? Go to [email or web address] and sign in to your [employer] account. Enter P492 in the [employer] box to learn more about \"Breastfeeding: Care Instructions. \"     If you do not have an account, please click on the \"Sign Up Now\" link. Current as of: October 8, 2020               Content Version: 12.9  © 2006-2021 Healthwise, [facility]. Care instructions adapted under license by [facility] ([facility]). If you have questions about a medical condition or this instruction, always ask your healthcare professional. [address] any warranty or liability for your use of this information.

## 2022-10-23 NOTE — PLAN OF CARE
Problem: Pain  Goal: Verbalizes/displays adequate comfort level or baseline comfort level  Outcome: Adequate for Discharge     Problem: Infection - Adult  Goal: Absence of infection at discharge  Outcome: Adequate for Discharge  Goal: Absence of infection during hospitalization  Outcome: Adequate for Discharge  Goal: Absence of fever/infection during anticipated neutropenic period  Outcome: Adequate for Discharge     Problem: Safety - Adult  Goal: Free from fall injury  Outcome: Adequate for Discharge     Problem: Discharge Planning  Goal: Discharge to home or other facility with appropriate resources  Outcome: Adequate for Discharge

## 2022-10-23 NOTE — FLOWSHEET NOTE
Patient discharged to home in stable condition via wheelchair carrying infant on her lap in car seat.  Patient and infant accompanied by fodona

## 2022-10-25 ENCOUNTER — APPOINTMENT (OUTPATIENT)
Dept: ULTRASOUND IMAGING | Age: 32
End: 2022-10-25

## 2022-10-25 VITALS
HEIGHT: 63 IN | OXYGEN SATURATION: 98 % | SYSTOLIC BLOOD PRESSURE: 130 MMHG | DIASTOLIC BLOOD PRESSURE: 74 MMHG | RESPIRATION RATE: 20 BRPM | TEMPERATURE: 99.3 F | WEIGHT: 180 LBS | HEART RATE: 85 BPM | BODY MASS INDEX: 31.89 KG/M2

## 2022-10-25 LAB
ALBUMIN SERPL-MCNC: 3.2 G/DL (ref 3.5–5.2)
ALP BLD-CCNC: 121 U/L (ref 35–104)
ALT SERPL-CCNC: 97 U/L (ref 0–32)
ANION GAP SERPL CALCULATED.3IONS-SCNC: 12 MMOL/L (ref 7–16)
AST SERPL-CCNC: 112 U/L (ref 0–31)
BASOPHILS ABSOLUTE: 0.03 E9/L (ref 0–0.2)
BASOPHILS RELATIVE PERCENT: 0.3 % (ref 0–2)
BILIRUB SERPL-MCNC: 0.2 MG/DL (ref 0–1.2)
BUN BLDV-MCNC: 11 MG/DL (ref 6–20)
CALCIUM SERPL-MCNC: 9.1 MG/DL (ref 8.6–10.2)
CHLORIDE BLD-SCNC: 100 MMOL/L (ref 98–107)
CO2: 24 MMOL/L (ref 22–29)
CREAT SERPL-MCNC: 0.6 MG/DL (ref 0.5–1)
EOSINOPHILS ABSOLUTE: 0.28 E9/L (ref 0.05–0.5)
EOSINOPHILS RELATIVE PERCENT: 2.8 % (ref 0–6)
GFR SERPL CREATININE-BSD FRML MDRD: >60 ML/MIN/1.73
GLUCOSE BLD-MCNC: 109 MG/DL (ref 74–99)
HCT VFR BLD CALC: 36.2 % (ref 34–48)
HEMOGLOBIN: 11.9 G/DL (ref 11.5–15.5)
IMMATURE GRANULOCYTES #: 0.05 E9/L
IMMATURE GRANULOCYTES %: 0.5 % (ref 0–5)
LYMPHOCYTES ABSOLUTE: 1.04 E9/L (ref 1.5–4)
LYMPHOCYTES RELATIVE PERCENT: 10.4 % (ref 20–42)
MCH RBC QN AUTO: 29.8 PG (ref 26–35)
MCHC RBC AUTO-ENTMCNC: 32.9 % (ref 32–34.5)
MCV RBC AUTO: 90.5 FL (ref 80–99.9)
MONOCYTES ABSOLUTE: 0.58 E9/L (ref 0.1–0.95)
MONOCYTES RELATIVE PERCENT: 5.8 % (ref 2–12)
NEUTROPHILS ABSOLUTE: 8.04 E9/L (ref 1.8–7.3)
NEUTROPHILS RELATIVE PERCENT: 80.2 % (ref 43–80)
PDW BLD-RTO: 12.8 FL (ref 11.5–15)
PLATELET # BLD: 185 E9/L (ref 130–450)
PMV BLD AUTO: 11.2 FL (ref 7–12)
POTASSIUM SERPL-SCNC: 4.2 MMOL/L (ref 3.5–5)
RBC # BLD: 4 E12/L (ref 3.5–5.5)
SODIUM BLD-SCNC: 136 MMOL/L (ref 132–146)
TOTAL PROTEIN: 6.3 G/DL (ref 6.4–8.3)
WBC # BLD: 10 E9/L (ref 4.5–11.5)

## 2022-10-25 PROCEDURE — 4500000002 HC ER NO CHARGE

## 2022-10-25 PROCEDURE — 80053 COMPREHEN METABOLIC PANEL: CPT

## 2022-10-25 PROCEDURE — 85025 COMPLETE CBC W/AUTO DIFF WBC: CPT

## 2022-10-25 PROCEDURE — 76856 US EXAM PELVIC COMPLETE: CPT

## 2022-10-25 ASSESSMENT — PAIN DESCRIPTION - LOCATION: LOCATION: HEAD

## 2022-10-25 ASSESSMENT — PAIN SCALES - GENERAL: PAINLEVEL_OUTOF10: 6

## 2022-10-25 ASSESSMENT — PAIN - FUNCTIONAL ASSESSMENT: PAIN_FUNCTIONAL_ASSESSMENT: 0-10

## 2022-10-25 ASSESSMENT — PAIN DESCRIPTION - FREQUENCY: FREQUENCY: CONTINUOUS

## 2022-10-25 ASSESSMENT — PAIN DESCRIPTION - PAIN TYPE: TYPE: ACUTE PAIN

## 2022-10-26 ENCOUNTER — HOSPITAL ENCOUNTER (EMERGENCY)
Age: 32
Discharge: LEFT BEFORE DISCHARGE | End: 2022-10-26

## 2022-10-26 NOTE — ED NOTES
Department of Emergency Medicine  FIRST PROVIDER TRIAGE NOTE             Independent MLP           10/25/22  10:06 PM EDT    Date of Encounter: 10/25/22   MRN: [id number]      HPI: Pearl Silva is a 27 y.o. female who presents to the ED for Fever (Pt had a  on Friday, having a fever of 101 today )   Pt presents to ED with fever, 4 days after C section. No worsening of lower ab pain than usual, no discharge. 101 at home. ROS: Negative for cp, sob, back pain, cough, or vomiting. PE: Gen Appearance/Constitutional: alert  CV: regular rate  Pulm: CTA bilat     Initial Plan of Care: All treatment areas with department are currently occupied. Plan to order/Initiate the following while awaiting opening in ED: labs, ultrasound, and antipyretics.   Initiate Treatment-Testing, Proceed toTreatment Area When Bed Available for ED Attending/MLP to Continue Care    Electronically signed by Carie Bryan PA-C   DD: 10/25/22     Carie Bryan PA-C  10/25/22 7422

## 2022-11-05 NOTE — H&P
CHIEF COMPLAINT:  patient is here for primary  due to the presence of amniotic bands    HISTORY OF PRESENT ILLNESS:      The patient is a 27 y.o. female at 36w4d. OB History          3    Para   2    Term   2            AB   1    Living   2         SAB        IAB   1    Ectopic        Molar        Multiple   0    Live Births   2            Patient presents with a chief complaint as above and is being admitted for   without tubal ligation    Estimated Due Date: Estimated Date of Delivery: 22    PRENATAL CARE:    Complicated by: presence of amniotic bands on ultrasound    PAST OB HISTORY  OB History          3    Para   2    Term   2            AB   1    Living   2         SAB        IAB   1    Ectopic        Molar        Multiple   0    Live Births   2                Past Medical History:        Diagnosis Date    Antepartum bleeding, second trimester 2022    Ovarian cyst      Past Surgical History:        Procedure Laterality Date     SECTION N/A 10/21/2022     SECTION performed by Rachele Johnston MD at [facility] L[facility]D OR    UMBILICAL HERNIA REPAIR       Allergies:  Patient has no known allergies.   Social History:    Social History     Socioeconomic History    Marital status:      Spouse name: Not on file    Number of children: Not on file    Years of education: Not on file    Highest education level: Not on file   Occupational History    Not on file   Tobacco Use    Smoking status: Never    Smokeless tobacco: Never   Vaping Use    Vaping Use: Never used   Substance and Sexual Activity    Alcohol use: No    Drug use: No    Sexual activity: Yes     Partners: Male   Other Topics Concern    Not on file   Social History Narrative    Not on file     Social Determinants of Health     Financial Resource Strain: Not on file   Food Insecurity: Not on file   Transportation Needs: Not on file   Physical Activity: Not on file   Stress: Not on file Social Connections: Not on file   Intimate Partner Violence: Not on file   Housing Stability: Not on file     Family History:       Problem Relation Age of Onset    Leukemia Maternal Grandmother     Hypertension Father     Hypertension Mother      Medications Prior to Admission:  No medications prior to admission. REVIEW OF SYSTEMS:    CONSTITUTIONAL:  negative  RESPIRATORY:  negative  CARDIOVASCULAR:  negative  GASTROINTESTINAL:  negative  ALLERGIC/IMMUNOLOGIC:  negative  NEUROLOGICAL:  negative  BEHAVIOR/PSYCH:  negative    PHYSICAL EXAM:  Vitals:    10/22/22 1735 10/22/22 2305 10/23/22 0752 10/23/22 1022   BP: (!) 117/59 (!) 109/59 106/60    Pulse: 91 93 92    Resp: 18 16 18 14   Temp: 98.2 °F (36.8 °C) 98.7 °F (37.1 °C) 98.7 °F (37.1 °C)    TempSrc: Oral Oral Oral    SpO2: 100% 97% 100%      General appearance:  awake, alert, cooperative, no apparent distress, and appears stated age  Neurologic:  Awake, alert, oriented to name, place and time. Lungs:  No increased work of breathing, good air exchange  Abdomen:  Soft, non tender, gravid, consistent with her gestational age,  Fetal heart rate:  Reassuring.   Pelvis:  Adequate pelvis  Cervix: Closed Long firm -3  Contraction frequency:  0 minutes    Membranes:  Intact    ASSESSMENT AND PLAN:    Labor: Admit, anticipate normal delivery, routine labor orders  Fetus: Reassuring    Other:

## 2023-12-05 ENCOUNTER — APPOINTMENT (OUTPATIENT)
Dept: GENERAL RADIOLOGY | Age: 33
End: 2023-12-05
Payer: OTHER GOVERNMENT

## 2023-12-05 ENCOUNTER — HOSPITAL ENCOUNTER (EMERGENCY)
Age: 33
Discharge: HOME | End: 2023-12-05
Payer: OTHER GOVERNMENT

## 2023-12-05 VITALS
RESPIRATION RATE: 18 BRPM | DIASTOLIC BLOOD PRESSURE: 77 MMHG | OXYGEN SATURATION: 99 % | WEIGHT: 180 LBS | HEART RATE: 81 BPM | SYSTOLIC BLOOD PRESSURE: 135 MMHG | TEMPERATURE: 98.3 F | BODY MASS INDEX: 31.89 KG/M2

## 2023-12-05 DIAGNOSIS — J40 BRONCHITIS: Primary | ICD-10-CM

## 2023-12-05 LAB
FLUAV RNA RESP QL NAA+PROBE: NOT DETECTED
FLUBV RNA RESP QL NAA+PROBE: NOT DETECTED
SARS-COV-2 RNA RESP QL NAA+PROBE: NOT DETECTED
SOURCE: NORMAL
SPECIMEN DESCRIPTION: NORMAL
SPECIMEN SOURCE: NORMAL
STREP A, MOLECULAR: NEGATIVE

## 2023-12-05 PROCEDURE — 99284 EMERGENCY DEPT VISIT MOD MDM: CPT

## 2023-12-05 PROCEDURE — 71046 X-RAY EXAM CHEST 2 VIEWS: CPT

## 2023-12-05 PROCEDURE — 87636 SARSCOV2 & INF A&B AMP PRB: CPT

## 2023-12-05 PROCEDURE — 87651 STREP A DNA AMP PROBE: CPT

## 2023-12-05 PROCEDURE — 2500000003 HC RX 250 WO HCPCS: Performed by: NURSE PRACTITIONER

## 2023-12-05 PROCEDURE — 6370000000 HC RX 637 (ALT 250 FOR IP): Performed by: NURSE PRACTITIONER

## 2023-12-05 RX ORDER — GUAIFENESIN 200 MG/10ML
200 LIQUID ORAL ONCE
Status: COMPLETED | OUTPATIENT
Start: 2023-12-05 | End: 2023-12-05

## 2023-12-05 RX ORDER — IPRATROPIUM BROMIDE AND ALBUTEROL SULFATE 2.5; .5 MG/3ML; MG/3ML
1 SOLUTION RESPIRATORY (INHALATION) ONCE
Status: COMPLETED | OUTPATIENT
Start: 2023-12-05 | End: 2023-12-05

## 2023-12-05 RX ORDER — AMOXICILLIN AND CLAVULANATE POTASSIUM 875; 125 MG/1; MG/1
1 TABLET, FILM COATED ORAL 2 TIMES DAILY
Qty: 20 TABLET | Refills: 0 | Status: SHIPPED | OUTPATIENT
Start: 2023-12-05 | End: 2023-12-15

## 2023-12-05 RX ORDER — ALBUTEROL SULFATE 90 UG/1
2 AEROSOL, METERED RESPIRATORY (INHALATION) 4 TIMES DAILY PRN
Qty: 18 G | Refills: 0 | Status: SHIPPED | OUTPATIENT
Start: 2023-12-05

## 2023-12-05 RX ORDER — METHYLPREDNISOLONE 4 MG/1
TABLET ORAL
Qty: 1 KIT | Refills: 0 | Status: SHIPPED | OUTPATIENT
Start: 2023-12-05 | End: 2023-12-11

## 2023-12-05 RX ADMIN — GUAIFENESIN 200 MG: 200 SOLUTION ORAL at 18:49

## 2023-12-05 RX ADMIN — IPRATROPIUM BROMIDE AND ALBUTEROL SULFATE 1 DOSE: .5; 3 SOLUTION RESPIRATORY (INHALATION) at 18:49

## 2023-12-05 ASSESSMENT — PAIN SCALES - GENERAL: PAINLEVEL_OUTOF10: 8

## 2023-12-05 ASSESSMENT — PAIN DESCRIPTION - FREQUENCY: FREQUENCY: CONTINUOUS

## 2023-12-05 ASSESSMENT — LIFESTYLE VARIABLES
HOW OFTEN DO YOU HAVE A DRINK CONTAINING ALCOHOL: NEVER
HOW MANY STANDARD DRINKS CONTAINING ALCOHOL DO YOU HAVE ON A TYPICAL DAY: PATIENT DOES NOT DRINK

## 2023-12-05 ASSESSMENT — PAIN DESCRIPTION - ONSET: ONSET: ON-GOING

## 2023-12-05 ASSESSMENT — PAIN DESCRIPTION - PAIN TYPE: TYPE: ACUTE PAIN

## 2023-12-05 ASSESSMENT — PAIN - FUNCTIONAL ASSESSMENT: PAIN_FUNCTIONAL_ASSESSMENT: 0-10

## 2023-12-05 ASSESSMENT — PAIN DESCRIPTION - LOCATION: LOCATION: CHEST

## 2023-12-06 NOTE — ED PROVIDER NOTES
Independent [facility] Visit. [address]        Pt Name: Gabriel Morgan  MRN: [id number]  [address] [facility] 1990  Date of evaluation: 2023  Provider: JUSTIN James CNP  PCP: JUSTIN John CNP  Note Started: 7:37 PM EST 23    CHIEF COMPLAINT       Chief Complaint   Patient presents with    URI     Ongoing 4 days, sore throat, coughing, some SOB        HISTORY OF PRESENT ILLNESS: 1 or more Elements     History from : Patient    Limitations to history : None    Gabriel Morgan is a 26 y.o. female who presents to the emergency department for URISymptoms. Patient states that she started several days ago with cough congestion sore throat body aches. 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 She states that there were multiple sick contacts. Patient states that she is up-to-date on her COVID-vaccine and flu vaccine. She states that she is coughing up yellow to green mucus. Patient denies any chest pain shortness of breath but states when she coughs her chest feels tight. Patient denies any nausea vomiting diarrhea. She denies any abdominal pain. Nursing Notes were all reviewed and agreed with or any disagreements were addressed in the HPI. REVIEW OF SYSTEMS :      Review of Systems   All other systems reviewed and are negative. Positives and Pertinent negatives as per HPI.      SURGICAL HISTORY     Past Surgical History:   Procedure Laterality Date     SECTION N/A 10/21/2022     SECTION performed by Elias Harrell MD at [facility] L&D OR    UMBILICAL HERNIA REPAIR          [facility]       Discharge Medication List as of 2023  7:34 PM        CONTINUE these medications which have NOT CHANGED    Details   Multiple Vitamin (MULTI-VITAMIN DAILY PO) Take by

## (undated) DEVICE — CATHETERIZATION KIT FOL16 FR 2000 CC DRAINAGE BG LUBRICATH

## (undated) DEVICE — HYPODERMIC SAFETY NEEDLE: Brand: MAGELLAN

## (undated) DEVICE — SHEET,DRAPE,53X77,STERILE: Brand: MEDLINE

## (undated) DEVICE — Device: Brand: PORTEX

## (undated) DEVICE — BLADE SURG NO20 S STL STR DISP GLASSVAN

## (undated) DEVICE — COUNTER NDL 30 COUNT DBL MAG

## (undated) DEVICE — 3000CC GUARDIAN II: Brand: GUARDIAN

## (undated) DEVICE — APPLICATOR PREP 26ML 0.7% IOD POVACRYLEX 74% ISO ALC ST

## (undated) DEVICE — ELECTRODE PT RET AD L9FT HI MOIST COND ADH HYDRGEL CORDED

## (undated) DEVICE — SUTURE STRATAFIX SPRL SZ 1 L14IN ABSRB VLT L48CM CTX 1/2 SXPD2B405

## (undated) DEVICE — COVER,LIGHT HANDLE,FLX,2/PK: Brand: MEDLINE INDUSTRIES, INC.

## (undated) DEVICE — PEN: MARKING STD 100/CS: Brand: MEDICAL ACTION INDUSTRIES

## (undated) DEVICE — STRIP,CLOSURE,WOUND,MEDI-STRIP,1/2X4: Brand: MEDLINE

## (undated) DEVICE — CESAREAN BIRTH PACK II: Brand: MEDLINE INDUSTRIES, INC.

## (undated) DEVICE — GOWN,SIRUS,POLYRNF,BRTHSLV,XLN/XL,20/CS: Brand: MEDLINE

## (undated) DEVICE — 3M™ STERI-STRIP™ COMPOUND BENZOIN TINCTURE 40 BAGS/CARTON 4 CARTONS/CASE C1544: Brand: 3M™ STERI-STRIP™

## (undated) DEVICE — STAPLER SKIN SQ 30 ABSRB STPL DISP INSORB

## (undated) DEVICE — MEDI-VAC YANKAUER SUCTION HANDLE W/BULBOUS TIP: Brand: CARDINAL HEALTH

## (undated) DEVICE — TOWEL,OR,DSP,ST,BLUE,STD,6/PK,12PK/CS: Brand: MEDLINE

## (undated) DEVICE — PENCIL ES L3M BTTN SWCH HOLSTER W/ BLDE ELECTRD EDGE

## (undated) DEVICE — GOWN,SIRUS,FABRNF,L,20/CS: Brand: MEDLINE

## (undated) DEVICE — SPONGE LAP W18XL18IN WHT COT 4 PLY FLD STRUNG RADPQ DISP ST

## (undated) DEVICE — GLOVE SURG SZ 75 L12IN FNGR THK83MIL CRM POLYISOPRENE

## (undated) DEVICE — SUTURE CHROMIC GUT SZ 1 L36IN ABSRB BRN L48MM CTX 1/2 CIR 905H

## (undated) DEVICE — CONTAINER,SPEC,PNEUM TUBE,3OZ,STRL PATH: Brand: MEDLINE

## (undated) DEVICE — TUBING, SUCTION, 3/16" X 12', STRAIGHT: Brand: MEDLINE

## (undated) DEVICE — CONTAINER SPEC 64OZ POLYPR PATH SNAP LOK CAP W/ LID

## (undated) DEVICE — GLOVE SURG SZ 65 L12IN FNGR THK83MIL CRM POLYISOPRENE

## (undated) DEVICE — TUBE BLD COLLECT ST 1 SIL COAT 7ML 10ML